# Patient Record
Sex: FEMALE | Race: WHITE | NOT HISPANIC OR LATINO | URBAN - METROPOLITAN AREA
[De-identification: names, ages, dates, MRNs, and addresses within clinical notes are randomized per-mention and may not be internally consistent; named-entity substitution may affect disease eponyms.]

---

## 2017-02-18 ENCOUNTER — EMERGENCY (EMERGENCY)
Facility: HOSPITAL | Age: 57
LOS: 1 days | Discharge: HOME | End: 2017-02-18
Attending: EMERGENCY MEDICINE
Payer: MEDICARE

## 2017-02-18 VITALS — WEIGHT: 111.99 LBS | HEIGHT: 63 IN

## 2017-02-18 DIAGNOSIS — Q87.1 CONGENITAL MALFORMATION SYNDROMES PREDOMINANTLY ASSOCIATED WITH SHORT STATURE: ICD-10-CM

## 2017-02-18 DIAGNOSIS — Z98.89 OTHER SPECIFIED POSTPROCEDURAL STATES: Chronic | ICD-10-CM

## 2017-02-18 DIAGNOSIS — F03.90 UNSPECIFIED DEMENTIA, UNSPECIFIED SEVERITY, WITHOUT BEHAVIORAL DISTURBANCE, PSYCHOTIC DISTURBANCE, MOOD DISTURBANCE, AND ANXIETY: ICD-10-CM

## 2017-02-18 DIAGNOSIS — E27.9 DISORDER OF ADRENAL GLAND, UNSPECIFIED: Chronic | ICD-10-CM

## 2017-02-18 DIAGNOSIS — G62.9 POLYNEUROPATHY, UNSPECIFIED: ICD-10-CM

## 2017-02-18 DIAGNOSIS — E03.9 HYPOTHYROIDISM, UNSPECIFIED: ICD-10-CM

## 2017-02-18 DIAGNOSIS — Z79.899 OTHER LONG TERM (CURRENT) DRUG THERAPY: ICD-10-CM

## 2017-02-18 DIAGNOSIS — Z91.041 RADIOGRAPHIC DYE ALLERGY STATUS: ICD-10-CM

## 2017-02-18 DIAGNOSIS — R10.84 GENERALIZED ABDOMINAL PAIN: ICD-10-CM

## 2017-02-18 DIAGNOSIS — R53.1 WEAKNESS: ICD-10-CM

## 2017-02-18 DIAGNOSIS — G90.9 DISORDER OF THE AUTONOMIC NERVOUS SYSTEM, UNSPECIFIED: ICD-10-CM

## 2017-02-18 DIAGNOSIS — R41.82 ALTERED MENTAL STATUS, UNSPECIFIED: ICD-10-CM

## 2017-02-18 DIAGNOSIS — R11.0 NAUSEA: ICD-10-CM

## 2017-02-18 DIAGNOSIS — G93.40 ENCEPHALOPATHY, UNSPECIFIED: ICD-10-CM

## 2017-02-18 DIAGNOSIS — R00.0 TACHYCARDIA, UNSPECIFIED: ICD-10-CM

## 2017-02-18 DIAGNOSIS — Z98.51 TUBAL LIGATION STATUS: Chronic | ICD-10-CM

## 2017-02-18 DIAGNOSIS — Z90.49 ACQUIRED ABSENCE OF OTHER SPECIFIED PARTS OF DIGESTIVE TRACT: Chronic | ICD-10-CM

## 2017-02-18 DIAGNOSIS — Z90.710 ACQUIRED ABSENCE OF BOTH CERVIX AND UTERUS: Chronic | ICD-10-CM

## 2017-02-18 DIAGNOSIS — F17.200 NICOTINE DEPENDENCE, UNSPECIFIED, UNCOMPLICATED: ICD-10-CM

## 2017-02-18 LAB
ALBUMIN SERPL ELPH-MCNC: 3.3 G/DL — SIGNIFICANT CHANGE UP (ref 3.3–5.2)
ALP SERPL-CCNC: 103 U/L — SIGNIFICANT CHANGE UP (ref 40–120)
ALT FLD-CCNC: 7 U/L — SIGNIFICANT CHANGE UP
AMMONIA BLD-MCNC: 39 UMOL/L — SIGNIFICANT CHANGE UP (ref 11–55)
ANION GAP SERPL CALC-SCNC: 16 MMOL/L — SIGNIFICANT CHANGE UP (ref 5–17)
APTT BLD: 28.5 SEC — SIGNIFICANT CHANGE UP (ref 27.5–37.4)
AST SERPL-CCNC: 17 U/L — SIGNIFICANT CHANGE UP
BASOPHILS # BLD AUTO: 0 K/UL — SIGNIFICANT CHANGE UP (ref 0–0.2)
BASOPHILS NFR BLD AUTO: 0.9 % — SIGNIFICANT CHANGE UP (ref 0–2)
BILIRUB SERPL-MCNC: 0.2 MG/DL — LOW (ref 0.4–2)
BUN SERPL-MCNC: 14 MG/DL — SIGNIFICANT CHANGE UP (ref 8–20)
CALCIUM SERPL-MCNC: 8.5 MG/DL — LOW (ref 8.6–10.2)
CHLORIDE SERPL-SCNC: 102 MMOL/L — SIGNIFICANT CHANGE UP (ref 98–107)
CO2 SERPL-SCNC: 20 MMOL/L — LOW (ref 22–29)
CREAT SERPL-MCNC: 0.78 MG/DL — SIGNIFICANT CHANGE UP (ref 0.5–1.3)
EOSINOPHIL # BLD AUTO: 0 K/UL — SIGNIFICANT CHANGE UP (ref 0–0.5)
EOSINOPHIL NFR BLD AUTO: 0.2 % — SIGNIFICANT CHANGE UP (ref 0–6)
GLUCOSE SERPL-MCNC: 90 MG/DL — SIGNIFICANT CHANGE UP (ref 70–115)
HCT VFR BLD CALC: 33.1 % — LOW (ref 37–47)
HGB BLD-MCNC: 11.2 G/DL — LOW (ref 12–16)
INR BLD: 1.24 RATIO — HIGH (ref 0.88–1.16)
LIDOCAIN IGE QN: 14 U/L — LOW (ref 22–51)
LYMPHOCYTES # BLD AUTO: 1.6 K/UL — SIGNIFICANT CHANGE UP (ref 1–4.8)
LYMPHOCYTES # BLD AUTO: 26.9 % — SIGNIFICANT CHANGE UP (ref 20–55)
MCHC RBC-ENTMCNC: 31.5 PG — HIGH (ref 27–31)
MCHC RBC-ENTMCNC: 33.8 G/DL — SIGNIFICANT CHANGE UP (ref 32–36)
MCV RBC AUTO: 93.2 FL — SIGNIFICANT CHANGE UP (ref 81–99)
MONOCYTES # BLD AUTO: 0.7 K/UL — SIGNIFICANT CHANGE UP (ref 0–0.8)
MONOCYTES NFR BLD AUTO: 12.8 % — HIGH (ref 3–10)
NEUTROPHILS # BLD AUTO: 3.4 K/UL — SIGNIFICANT CHANGE UP (ref 1.8–8)
NEUTROPHILS NFR BLD AUTO: 58.9 % — SIGNIFICANT CHANGE UP (ref 37–73)
PLATELET # BLD AUTO: 251 K/UL — SIGNIFICANT CHANGE UP (ref 150–400)
POTASSIUM SERPL-MCNC: 3 MMOL/L — LOW (ref 3.5–5.3)
POTASSIUM SERPL-SCNC: 3 MMOL/L — LOW (ref 3.5–5.3)
PROT SERPL-MCNC: 8 G/DL — SIGNIFICANT CHANGE UP (ref 6.6–8.7)
PROTHROM AB SERPL-ACNC: 13.7 SEC — HIGH (ref 10–13.1)
RBC # BLD: 3.55 M/UL — LOW (ref 4.4–5.2)
RBC # FLD: 16.9 % — HIGH (ref 11–15.6)
SODIUM SERPL-SCNC: 138 MMOL/L — SIGNIFICANT CHANGE UP (ref 135–145)
TROPONIN T SERPL-MCNC: <0.01 NG/ML — SIGNIFICANT CHANGE UP (ref 0–0.06)
WBC # BLD: 5.8 K/UL — SIGNIFICANT CHANGE UP (ref 4.8–10.8)
WBC # FLD AUTO: 5.8 K/UL — SIGNIFICANT CHANGE UP (ref 4.8–10.8)

## 2017-02-18 PROCEDURE — 71020: CPT | Mod: 26

## 2017-02-18 PROCEDURE — 99285 EMERGENCY DEPT VISIT HI MDM: CPT

## 2017-02-18 PROCEDURE — 70450 CT HEAD/BRAIN W/O DYE: CPT | Mod: 26

## 2017-02-18 PROCEDURE — 74176 CT ABD & PELVIS W/O CONTRAST: CPT | Mod: 26

## 2017-02-18 PROCEDURE — 93010 ELECTROCARDIOGRAM REPORT: CPT

## 2017-02-18 RX ORDER — SODIUM CHLORIDE 9 MG/ML
3 INJECTION INTRAMUSCULAR; INTRAVENOUS; SUBCUTANEOUS ONCE
Qty: 0 | Refills: 0 | Status: COMPLETED | OUTPATIENT
Start: 2017-02-18 | End: 2017-02-18

## 2017-02-18 RX ORDER — SODIUM CHLORIDE 9 MG/ML
1000 INJECTION INTRAMUSCULAR; INTRAVENOUS; SUBCUTANEOUS ONCE
Qty: 0 | Refills: 0 | Status: COMPLETED | OUTPATIENT
Start: 2017-02-18 | End: 2017-02-18

## 2017-02-18 RX ORDER — NALOXONE HYDROCHLORIDE 4 MG/.1ML
0.4 SPRAY NASAL ONCE
Qty: 0 | Refills: 0 | Status: COMPLETED | OUTPATIENT
Start: 2017-02-18 | End: 2017-02-18

## 2017-02-18 RX ORDER — POTASSIUM CHLORIDE 20 MEQ
40 PACKET (EA) ORAL ONCE
Qty: 0 | Refills: 0 | Status: COMPLETED | OUTPATIENT
Start: 2017-02-18 | End: 2017-02-18

## 2017-02-18 RX ADMIN — SODIUM CHLORIDE 1000 MILLILITER(S): 9 INJECTION INTRAMUSCULAR; INTRAVENOUS; SUBCUTANEOUS at 15:00

## 2017-02-18 RX ADMIN — NALOXONE HYDROCHLORIDE 0.4 MILLIGRAM(S): 4 SPRAY NASAL at 15:00

## 2017-02-18 RX ADMIN — Medication 40 MILLIEQUIVALENT(S): at 22:51

## 2017-02-18 RX ADMIN — SODIUM CHLORIDE 3 MILLILITER(S): 9 INJECTION INTRAMUSCULAR; INTRAVENOUS; SUBCUTANEOUS at 15:00

## 2017-02-18 NOTE — ED PROVIDER NOTE - PMH
Cushings syndrome    Hypothyroidism    Lupus    POTS (postural orthostatic tachycardia syndrome)    PVD (peripheral vascular disease)    Sjogren-Addy syndrome  herniated disc, thorasic

## 2017-02-18 NOTE — CONSULT NOTE ADULT - ASSESSMENT
Encephalopathy - possibly from medication (narcotic or benzodiazepines).  Doubt meningitis or stroke. autonomic dysfunction might account for nausea/vomiting.  would be concerned about infection.   Sjogrens - polyneuropathy, autonomic dysfunction.

## 2017-02-18 NOTE — CONSULT NOTE ADULT - SUBJECTIVE AND OBJECTIVE BOX
HPI:  56 year old female with sjogrens syndrome and dysautonomia who has had multiple admissions for unresponsive and syncope. she is being treated with ivig and rituxan, and multiple medications. she has done well but has episodes of lethargy and unresponsive that has been related to use of narcotic or benzodiazepines self-medication at home. She has been lethargic at home and has not eaten nor taken medications ? and now is brought in with lethargy and poor responsiveness.      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 No nausea, vomiting, or hematemesis;  No melena or hematochezia.  GENITOURINARY: No  hematuria, or incontinence  NEUROLOGICAL: No headaches, memory loss,   SKIN: No itching, rashes, or lesions   LYMPH NODES: No enlarged glands  ENDOCRINE: No heat or cold intolerance;   MUSCULOSKELETAL: No joint swelling; No extremity pain  PSYCHIATRIC: No depression, anxiety, mood swings, or difficulty sleeping  HEME/LYMPH: No easy bruising, or bleeding gums    MEDICATIONS  (STANDING):  sodium chloride 0.9% lock flush 3milliLiter(s) IV Push once  naloxone Injectable 0.4milliGRAM(s) IV Push Once    MEDICATIONS  (PRN):      Allergies    contrast media (iodine-based) (Rash)  ertapenem (Other)    Intolerances        SOCIAL HISTORY:    FAMILY HISTORY:  No pertinent family history in first degree relatives      PHYSICAL EXAM:  Vital Signs Last 24 Hrs  T(F): 98  HR: 84  BP: 128/74  RR: 16  Wt(kg): --    GENERAL: NAD, well-groomed, well-developed  HEAD:  Atraumatic, Normocephalic  EYES: EOMI, PERRLA, conjunctiva and sclera clear  NECK: Supple, No JVD, thyroid non-palpable  Chest: Clear  HEART: Regular rate and rhythm; No murmurs audible  Vascular - no carotid bruit  Peripheral - no edema in the legs.  Skin - no rashes  NERVOUS SYSTEM:  lethargic, easily arouses, speech dysarthric, follows some simple commands. pupils equal and reactive. EOMI. Visual fields full to threat. Facial strength normal. variable movement all 4 limbs spontaneously but withdraws symmetrically to pain. motor tone normal, DTR reduced. plantars flexor.   Psych: mood flattened.           LABS: pending      RADIOLOGY & ADDITIONAL STUDIES:  CT brain - pending

## 2017-02-18 NOTE — ED ADULT TRIAGE NOTE - CHIEF COMPLAINT QUOTE
BIBA from home with AMS x several days as per EMS. EMS states patient's family reported pt with "many neurological diseases."  FS 91. C/O weakness and right sided abdominal pain. Speech slow but logical. Unaware of patient's baseline. Awaiting family arrival. 20g L AC. Received  cc bolus.

## 2017-02-18 NOTE — CONSULT NOTE ADULT - PROBLEM SELECTOR RECOMMENDATION 5
review electrolytes and CT brain.  CMP-CBC, ESR, amylase, calcium-phosphate; immunofixation, quantitative immunoglobulin, C-reactive protein, CPK, aldolase  review medications being prescribed as outpatient re florinef/midodrine and other medicaitons.  urine tox-alcohol levels  IV fluids for hydration, nutritional support, telemetry, social service.

## 2017-02-18 NOTE — ED PROVIDER NOTE - OBJECTIVE STATEMENT
55 yo F with pmh of cushings, sjorgren-beebe syndrom, hypothyroidism, lupus, postural orthostatic hypotension syndrome presents to the ED for AMS. Pt states she has some abdominal pain x 2 days. As well as vomiting.

## 2017-02-18 NOTE — ED ADULT NURSE NOTE - OBJECTIVE STATEMENT
Pt biba from home with days of AMS, pt is easily aroused but very sleepy.  Pt given narcan 0.4 ivp and became more alert. She takes multiple medications for her med hx.  Hr is regular, lung sounds clear b/l abd soft with positve bowel sounds in all four quadrants will cont to monitor.

## 2017-02-19 VITALS
HEART RATE: 74 BPM | RESPIRATION RATE: 16 BRPM | SYSTOLIC BLOOD PRESSURE: 118 MMHG | OXYGEN SATURATION: 96 % | TEMPERATURE: 98 F | DIASTOLIC BLOOD PRESSURE: 76 MMHG

## 2017-02-19 LAB
CK SERPL-CCNC: 41 U/L — SIGNIFICANT CHANGE UP (ref 25–170)
CRP SERPL-MCNC: 0.3 MG/DL — SIGNIFICANT CHANGE UP (ref 0–0.4)
IGE SERPL-ACNC: 25 IU/ML — SIGNIFICANT CHANGE UP (ref 0–100)
PHOSPHATE SERPL-MCNC: 3.6 MG/DL — SIGNIFICANT CHANGE UP (ref 2.4–4.7)
T3 SERPL-MCNC: 62 NG/DL — LOW (ref 80–200)

## 2017-02-19 PROCEDURE — 85652 RBC SED RATE AUTOMATED: CPT

## 2017-02-19 PROCEDURE — 93005 ELECTROCARDIOGRAM TRACING: CPT

## 2017-02-19 PROCEDURE — 84436 ASSAY OF TOTAL THYROXINE: CPT

## 2017-02-19 PROCEDURE — 85610 PROTHROMBIN TIME: CPT

## 2017-02-19 PROCEDURE — 85730 THROMBOPLASTIN TIME PARTIAL: CPT

## 2017-02-19 PROCEDURE — 86140 C-REACTIVE PROTEIN: CPT

## 2017-02-19 PROCEDURE — 83690 ASSAY OF LIPASE: CPT

## 2017-02-19 PROCEDURE — 82962 GLUCOSE BLOOD TEST: CPT

## 2017-02-19 PROCEDURE — 70450 CT HEAD/BRAIN W/O DYE: CPT

## 2017-02-19 PROCEDURE — 84100 ASSAY OF PHOSPHORUS: CPT

## 2017-02-19 PROCEDURE — 80053 COMPREHEN METABOLIC PANEL: CPT

## 2017-02-19 PROCEDURE — 71046 X-RAY EXAM CHEST 2 VIEWS: CPT

## 2017-02-19 PROCEDURE — 82085 ASSAY OF ALDOLASE: CPT

## 2017-02-19 PROCEDURE — 84443 ASSAY THYROID STIM HORMONE: CPT

## 2017-02-19 PROCEDURE — 82140 ASSAY OF AMMONIA: CPT

## 2017-02-19 PROCEDURE — 96374 THER/PROPH/DIAG INJ IV PUSH: CPT

## 2017-02-19 PROCEDURE — 84480 ASSAY TRIIODOTHYRONINE (T3): CPT

## 2017-02-19 PROCEDURE — 85027 COMPLETE CBC AUTOMATED: CPT

## 2017-02-19 PROCEDURE — 99285 EMERGENCY DEPT VISIT HI MDM: CPT | Mod: 25

## 2017-02-19 PROCEDURE — 84484 ASSAY OF TROPONIN QUANT: CPT

## 2017-02-19 PROCEDURE — 82550 ASSAY OF CK (CPK): CPT

## 2017-02-19 PROCEDURE — 86334 IMMUNOFIX E-PHORESIS SERUM: CPT

## 2017-02-19 PROCEDURE — 74176 CT ABD & PELVIS W/O CONTRAST: CPT

## 2017-02-19 PROCEDURE — 82785 ASSAY OF IGE: CPT

## 2017-02-19 NOTE — PROGRESS NOTE ADULT - SUBJECTIVE AND OBJECTIVE BOX
CHIEF COMPLAINT/INTERVAL HISTORY:  patient was evaluated in the Er because of AMS. patient was seen by Dr. Brewer, Neurologist who recommend Blood tests. patient feels better and wants to go home. labs and CT scan results were reviewed with the patient. patient defers admission and prefers to f/u with the neurologist as outpatient. Dr. Brewer was called and informed about the patient's desire to go home and f/u as outpatient. Dr Brewer advised if the patient feels fine she may go home and f/u as outpatient as well as f/u labs results.    REVIEW OF SYSTEMS:    Vital Signs Last 24 Hrs  T(C): 36.7, Max: 36.7 (02-18 @ 13:49)  T(F): 98, Max: 98 (02-18 @ 13:49)  HR: 86 (84 - 86)  BP: 130/74 (128/74 - 130/74)  BP(mean): --  RR: 18 (16 - 18)  SpO2: 99% (99% - 99%)    PHYSICAL EXAM:  GENERAL: NAD  HEENT: EOMI, hearing normal, conjunctiva and sclera clear  Chest: CTA bilaterally  CV: S1S2, RRR, no edema  GI: soft, +BS, NT/ND  Musculoskeletal: FROM, no deformity  Psychiatric: affect nL, mood nL  Skin: warm and dry    LABS:                        11.2   5.8   )-----------( 251      ( 18 Feb 2017 16:00 )             33.1     18 Feb 2017 16:00    138    |  102    |  14.0   ----------------------------<  90     3.0     |  20.0   |  0.78     Ca    8.5        18 Feb 2017 16:00    TPro  8.0    /  Alb  3.3    /  TBili  0.2    /  DBili  x      /  AST  17     /  ALT  7      /  AlkPhos  103    18 Feb 2017 16:00    PT/INR - ( 18 Feb 2017 16:00 )   PT: 13.7 sec;   INR: 1.24 ratio         PTT - ( 18 Feb 2017 16:00 )  PTT:28.5 sec      Assessment and Plan:

## 2017-02-19 NOTE — ED ADULT NURSE REASSESSMENT NOTE - NS ED NURSE REASSESS COMMENT FT1
pt stated she feels dizzy.  unsteady gait to bathroom.  pt assisted to the bathroom and back to bed. pt encouraged to reconsider refusing admission and agree to overnight observation.  pt refused.  states she does not live alone and has someone at home that can watch her.  dr ivory called to bedside to reassess pt.  pt stated to dr ivory that she always has an unsteady gait due to orthostatic tachycardia.  also stated she does not want to stay and will call friend to pick her up.

## 2017-02-20 LAB — INTERPRETATION SERPL IFE-IMP: SIGNIFICANT CHANGE UP

## 2017-02-26 NOTE — ED ADULT NURSE NOTE - IN THE PAST 12 MONTHS HAVE YOU USED DRUGS OTHER THAN THOSE REQUIRED FOR MEDICAL REASON?
Back Pain: Care Instructions  Your Care Instructions    Back pain has many possible causes. It is often related to problems with muscles and ligaments of the back. It may also be related to problems with the nerves, discs, or bones of the back. Moving, lifting, standing, sitting, or sleeping in an awkward way can strain the back. Sometimes you don't notice the injury until later. Arthritis is another common cause of back pain. Although it may hurt a lot, back pain usually improves on its own within several weeks. Most people recover in 12 weeks or less. Using good home treatment and being careful not to stress your back can help you feel better sooner. Follow-up care is a key part of your treatment and safety. Be sure to make and go to all appointments, and call your doctor if you are having problems. Its also a good idea to know your test results and keep a list of the medicines you take. How can you care for yourself at home? · Sit or lie in positions that are most comfortable and reduce your pain. Try one of these positions when you lie down:  ¨ Lie on your back with your knees bent and supported by large pillows. ¨ Lie on the floor with your legs on the seat of a sofa or chair. Mary David on your side with your knees and hips bent and a pillow between your legs. ¨ Lie on your stomach if it does not make pain worse. · Do not sit up in bed, and avoid soft couches and twisted positions. Bed rest can help relieve pain at first, but it delays healing. Avoid bed rest after the first day of back pain. · Change positions every 30 minutes. If you must sit for long periods of time, take breaks from sitting. Get up and walk around, or lie in a comfortable position. · Try using a heating pad on a low or medium setting for 15 to 20 minutes every 2 or 3 hours. Try a warm shower in place of one session with the heating pad. · You can also try an ice pack for 10 to 15 minutes every 2 to 3 hours.  Put a thin cloth between the ice pack and your skin. · Take pain medicines exactly as directed. ¨ If the doctor gave you a prescription medicine for pain, take it as prescribed. ¨ If you are not taking a prescription pain medicine, ask your doctor if you can take an over-the-counter medicine. · Take short walks several times a day. You can start with 5 to 10 minutes, 3 or 4 times a day, and work up to longer walks. Walk on level surfaces and avoid hills and stairs until your back is better. · Return to work and other activities as soon as you can. Continued rest without activity is usually not good for your back. · To prevent future back pain, do exercises to stretch and strengthen your back and stomach. Learn how to use good posture, safe lifting techniques, and proper body mechanics. When should you call for help? Call your doctor now or seek immediate medical care if:  · You have new or worsening numbness in your legs. · You have new or worsening weakness in your legs. (This could make it hard to stand up.)  · You lose control of your bladder or bowels. Watch closely for changes in your health, and be sure to contact your doctor if:  · Your pain gets worse. · You are not getting better after 2 weeks. Where can you learn more? Go to http://suma-huong.info/. Enter Z629 in the search box to learn more about \"Back Pain: Care Instructions. \"  Current as of: May 23, 2016  Content Version: 11.1  © 1686-1483 [employer] video. Care instructions adapted under license by [employer] (which disclaims liability or warranty for this information). If you have questions about a medical condition or this instruction, always ask your healthcare professional. [address] any warranty or liability for your use of this information. We hope that we have addressed all of your medical concerns.  The examination and treatment you received in the Emergency Department were for an emergent problem and were not intended as complete care. It is important that you follow up with your healthcare provider(s) for ongoing care. If your symptoms worsen or do not improve as expected, and you are unable to reach your usual health care provider(s), you should return to the Emergency Department. Today's healthcare is undergoing tremendous change, and patient satisfaction surveys are one of the many tools to assess the quality of medical care. You may receive a survey from the [employer] regarding your experience in the Emergency Department. I hope that your experience has been completely positive, particularly the medical care that I provided. As such, please participate in the survey; anything less than excellent does not meet my expectations or intentions. [facility] and [facility] participate in nationally recognized quality of care measures. If your blood pressure is greater than 120/80, as reported below, we urge that you seek medical care to address the potential of high blood pressure, commonly known as hypertension. Hypertension can be hereditary or can be caused by certain medical conditions, pain, stress, or \"white coat syndrome. \"       Please make an appointment with your health care provider(s) for follow up of your Emergency Department visit. VITALS:   Patient Vitals for the past 8 hrs:   Temp Pulse Resp BP SpO2   02/26/17 1226 98.4 °F (36.9 °C) 65 16 (!) 149/99 99 %          Thank you for allowing us to provide you with medical care today. We realize that you have many choices for your emergency care needs. Please choose us in the future for any continued health care needs. Jh Ovalle, [address] [place] [address] 20.   Office: [phone]            Recent Results (from the past 24 hour(s))   URINALYSIS W/MICROSCOPIC    Collection Time: 02/26/17  2:16 PM   Result Value Ref Range    Color YELLOW/STRAW      Appearance CLOUDY (A) CLEAR      Specific gravity 1.020 1.003 - 1.030      pH (UA) 7.0 5.0 - 8.0      Protein NEGATIVE  NEG mg/dL    Glucose NEGATIVE  NEG mg/dL    Ketone NEGATIVE  NEG mg/dL    Bilirubin NEGATIVE  NEG      Blood NEGATIVE  NEG      Urobilinogen 0.2 0.2 - 1.0 EU/dL    Nitrites NEGATIVE  NEG      Leukocyte Esterase NEGATIVE  NEG      WBC 0-4 0 - 4 /hpf    RBC 0-5 0 - 5 /hpf    Epithelial cells FEW FEW /lpf    Bacteria NEGATIVE  NEG /hpf    Hyaline cast 0-2 0 - 5 /lpf       Xr Spine Lumb 2 Or 3 V    Result Date: 2/26/2017  EXAM:  XR SPINE LUMB 2 OR 3 V INDICATION:   Low back pain COMPARISON: None. FINDINGS: AP, lateral and spot lateral views of the lumbar spine demonstrate normal alignment. The vertebral body heights and disc spaces are well-preserved. There is no fracture, subluxation or other abnormality. IMPRESSION:  Unremarkable lumbar spine. No

## 2017-03-28 ENCOUNTER — INPATIENT (INPATIENT)
Facility: HOSPITAL | Age: 57
LOS: 7 days | Discharge: HOME | DRG: 312 | End: 2017-04-05
Attending: HOSPITALIST | Admitting: INTERNAL MEDICINE
Payer: MEDICARE

## 2017-03-28 VITALS
OXYGEN SATURATION: 98 % | HEIGHT: 63 IN | SYSTOLIC BLOOD PRESSURE: 117 MMHG | RESPIRATION RATE: 20 BRPM | DIASTOLIC BLOOD PRESSURE: 68 MMHG | HEART RATE: 122 BPM | TEMPERATURE: 98 F | WEIGHT: 134.92 LBS

## 2017-03-28 DIAGNOSIS — Z98.89 OTHER SPECIFIED POSTPROCEDURAL STATES: Chronic | ICD-10-CM

## 2017-03-28 DIAGNOSIS — R56.9 UNSPECIFIED CONVULSIONS: ICD-10-CM

## 2017-03-28 DIAGNOSIS — R94.31 ABNORMAL ELECTROCARDIOGRAM [ECG] [EKG]: ICD-10-CM

## 2017-03-28 DIAGNOSIS — R55 SYNCOPE AND COLLAPSE: ICD-10-CM

## 2017-03-28 DIAGNOSIS — E27.9 DISORDER OF ADRENAL GLAND, UNSPECIFIED: Chronic | ICD-10-CM

## 2017-03-28 DIAGNOSIS — Z29.9 ENCOUNTER FOR PROPHYLACTIC MEASURES, UNSPECIFIED: ICD-10-CM

## 2017-03-28 DIAGNOSIS — E03.9 HYPOTHYROIDISM, UNSPECIFIED: ICD-10-CM

## 2017-03-28 DIAGNOSIS — I95.1 ORTHOSTATIC HYPOTENSION: ICD-10-CM

## 2017-03-28 DIAGNOSIS — Z90.710 ACQUIRED ABSENCE OF BOTH CERVIX AND UTERUS: Chronic | ICD-10-CM

## 2017-03-28 DIAGNOSIS — Z98.51 TUBAL LIGATION STATUS: Chronic | ICD-10-CM

## 2017-03-28 DIAGNOSIS — Z90.49 ACQUIRED ABSENCE OF OTHER SPECIFIED PARTS OF DIGESTIVE TRACT: Chronic | ICD-10-CM

## 2017-03-28 LAB
ALBUMIN SERPL ELPH-MCNC: 3.4 G/DL — SIGNIFICANT CHANGE UP (ref 3.3–5.2)
ALP SERPL-CCNC: 85 U/L — SIGNIFICANT CHANGE UP (ref 40–120)
ALT FLD-CCNC: 10 U/L — SIGNIFICANT CHANGE UP
AMPHET UR-MCNC: NEGATIVE — SIGNIFICANT CHANGE UP
ANION GAP SERPL CALC-SCNC: 13 MMOL/L — SIGNIFICANT CHANGE UP (ref 5–17)
APAP SERPL-MCNC: 12.8 UG/ML — SIGNIFICANT CHANGE UP (ref 10–26)
APPEARANCE UR: CLEAR — SIGNIFICANT CHANGE UP
AST SERPL-CCNC: 18 U/L — SIGNIFICANT CHANGE UP
BARBITURATES UR SCN-MCNC: NEGATIVE — SIGNIFICANT CHANGE UP
BENZODIAZ UR-MCNC: NEGATIVE — SIGNIFICANT CHANGE UP
BILIRUB SERPL-MCNC: 0.2 MG/DL — LOW (ref 0.4–2)
BILIRUB UR-MCNC: NEGATIVE — SIGNIFICANT CHANGE UP
BUN SERPL-MCNC: 13 MG/DL — SIGNIFICANT CHANGE UP (ref 8–20)
CALCIUM SERPL-MCNC: 8.7 MG/DL — SIGNIFICANT CHANGE UP (ref 8.6–10.2)
CHLORIDE SERPL-SCNC: 102 MMOL/L — SIGNIFICANT CHANGE UP (ref 98–107)
CO2 SERPL-SCNC: 21 MMOL/L — LOW (ref 22–29)
COCAINE METAB.OTHER UR-MCNC: NEGATIVE — SIGNIFICANT CHANGE UP
COLOR SPEC: YELLOW — SIGNIFICANT CHANGE UP
CREAT SERPL-MCNC: 0.58 MG/DL — SIGNIFICANT CHANGE UP (ref 0.5–1.3)
DIFF PNL FLD: NEGATIVE — SIGNIFICANT CHANGE UP
ETHANOL SERPL-MCNC: <10 MG/DL — SIGNIFICANT CHANGE UP
GLUCOSE SERPL-MCNC: 94 MG/DL — SIGNIFICANT CHANGE UP (ref 70–115)
GLUCOSE UR QL: NEGATIVE MG/DL — SIGNIFICANT CHANGE UP
HCT VFR BLD CALC: 32.6 % — LOW (ref 37–47)
HGB BLD-MCNC: 10.9 G/DL — LOW (ref 12–16)
KETONES UR-MCNC: NEGATIVE — SIGNIFICANT CHANGE UP
LEUKOCYTE ESTERASE UR-ACNC: NEGATIVE — SIGNIFICANT CHANGE UP
LIDOCAIN IGE QN: 11 U/L — LOW (ref 22–51)
MAGNESIUM SERPL-MCNC: 2 MG/DL — SIGNIFICANT CHANGE UP (ref 1.8–2.5)
MCHC RBC-ENTMCNC: 32.2 PG — HIGH (ref 27–31)
MCHC RBC-ENTMCNC: 33.4 G/DL — SIGNIFICANT CHANGE UP (ref 32–36)
MCV RBC AUTO: 96.4 FL — SIGNIFICANT CHANGE UP (ref 81–99)
METHADONE UR-MCNC: POSITIVE
NITRITE UR-MCNC: NEGATIVE — SIGNIFICANT CHANGE UP
OPIATES UR-MCNC: NEGATIVE — SIGNIFICANT CHANGE UP
PCP SPEC-MCNC: SIGNIFICANT CHANGE UP
PCP UR-MCNC: NEGATIVE — SIGNIFICANT CHANGE UP
PH UR: 6 — SIGNIFICANT CHANGE UP (ref 4.8–8)
PLATELET # BLD AUTO: 299 K/UL — SIGNIFICANT CHANGE UP (ref 150–400)
POTASSIUM SERPL-MCNC: 4 MMOL/L — SIGNIFICANT CHANGE UP (ref 3.5–5.3)
POTASSIUM SERPL-SCNC: 4 MMOL/L — SIGNIFICANT CHANGE UP (ref 3.5–5.3)
PROT SERPL-MCNC: 8.8 G/DL — HIGH (ref 6.6–8.7)
PROT UR-MCNC: NEGATIVE MG/DL — SIGNIFICANT CHANGE UP
RBC # BLD: 3.38 M/UL — LOW (ref 4.4–5.2)
RBC # FLD: 18 % — HIGH (ref 11–15.6)
SALICYLATES SERPL-MCNC: <2 MG/DL — LOW (ref 10–20)
SODIUM SERPL-SCNC: 136 MMOL/L — SIGNIFICANT CHANGE UP (ref 135–145)
SP GR SPEC: 1.01 — SIGNIFICANT CHANGE UP (ref 1.01–1.02)
THC UR QL: NEGATIVE — SIGNIFICANT CHANGE UP
TROPONIN T SERPL-MCNC: <0.01 NG/ML — SIGNIFICANT CHANGE UP (ref 0–0.06)
TSH SERPL-MCNC: 5.8 UIU/ML — HIGH (ref 0.27–4.2)
UROBILINOGEN FLD QL: NEGATIVE MG/DL — SIGNIFICANT CHANGE UP
WBC # BLD: 5.3 K/UL — SIGNIFICANT CHANGE UP (ref 4.8–10.8)
WBC # FLD AUTO: 5.3 K/UL — SIGNIFICANT CHANGE UP (ref 4.8–10.8)

## 2017-03-28 PROCEDURE — 93306 TTE W/DOPPLER COMPLETE: CPT | Mod: 26

## 2017-03-28 PROCEDURE — 99285 EMERGENCY DEPT VISIT HI MDM: CPT

## 2017-03-28 PROCEDURE — 70450 CT HEAD/BRAIN W/O DYE: CPT | Mod: 26

## 2017-03-28 PROCEDURE — 99233 SBSQ HOSP IP/OBS HIGH 50: CPT

## 2017-03-28 PROCEDURE — 93880 EXTRACRANIAL BILAT STUDY: CPT | Mod: 26

## 2017-03-28 RX ORDER — MAGNESIUM SULFATE 500 MG/ML
2 VIAL (ML) INJECTION ONCE
Qty: 0 | Refills: 0 | Status: COMPLETED | OUTPATIENT
Start: 2017-03-28 | End: 2017-03-28

## 2017-03-28 RX ORDER — FLUDROCORTISONE ACETATE 0.1 MG/1
0.1 TABLET ORAL DAILY
Qty: 0 | Refills: 0 | Status: DISCONTINUED | OUTPATIENT
Start: 2017-03-29 | End: 2017-04-05

## 2017-03-28 RX ORDER — SODIUM CHLORIDE 9 MG/ML
1000 INJECTION INTRAMUSCULAR; INTRAVENOUS; SUBCUTANEOUS
Qty: 0 | Refills: 0 | Status: DISCONTINUED | OUTPATIENT
Start: 2017-03-28 | End: 2017-03-28

## 2017-03-28 RX ORDER — MIDODRINE HYDROCHLORIDE 2.5 MG/1
5 TABLET ORAL DAILY
Qty: 0 | Refills: 0 | Status: DISCONTINUED | OUTPATIENT
Start: 2017-03-29 | End: 2017-04-05

## 2017-03-28 RX ORDER — LEVOTHYROXINE SODIUM 125 MCG
50 TABLET ORAL DAILY
Qty: 0 | Refills: 0 | Status: DISCONTINUED | OUTPATIENT
Start: 2017-03-29 | End: 2017-03-30

## 2017-03-28 RX ADMIN — Medication 50 GRAM(S): at 18:39

## 2017-03-28 NOTE — H&P ADULT - HISTORY OF PRESENT ILLNESS
56 F with PMH sjogrens syndrome with peripheral neuropathy, chronic inflammatory demyelinating polyneuritis, restless leg syndrome, dysautonomia, GI dysmotility, orthostatic hypotension (on midodrine), patient has prior multiple hospitalizations for unresponsiveness/ syncope as well as prior concern for self medication with narcotics/ benzos. Patient follows with neurologist Dr. Brewer and is on treatment with IVIG and rituxan. Patient was in the neurologist office today and had a spell of decreased responsiveness. Patient recalls receiving the IVIG and then waking up in the hospital. Denies urinary/bowel incontinence.     BP supine 118/81 .. Normal Brain MRI April 2016. No h/o seizures 56 F with PMH sjogrens syndrome with peripheral neuropathy, chronic inflammatory demyelinating polyneuritis, restless leg syndrome, dysautonomia, GI dysmotility, orthostatic hypotension (on midodrine), patient has prior multiple hospitalizations for unresponsiveness/ syncope as well as prior concern for self medication with narcotics/ benzos. Patient follows with neurologist Dr. Brewer and is on treatment with IVIG and rituxan. Patient was in the neurologist office today and had a spell of decreased responsiveness. Patient recalls receiving the IVIG and then waking up in the hospital. Denies urinary/bowel incontinence. As per neuro normal brain MRI April 2016. Patient denies chest pain, SOB, abd pain, N/V, fever, chills, dysuria or any other complaints. All remainder ROS negative. In ER found to have prolonged QTC:610 and was given magnesium 2grams. Patient remained asymptomatic.

## 2017-03-28 NOTE — H&P ADULT - PSH
Adrenal cortex disease  partial adrenal gland removed   H/O foot surgery  left ankle   S/P appendectomy    S/P  section    S/P cholecystectomy    S/P hysterectomy  2015  S/P tubal ligation  and ablation    infusive port 2015

## 2017-03-28 NOTE — ED ADULT TRIAGE NOTE - CHIEF COMPLAINT QUOTE
pt alert and awake, + AMS, BIBA, as per ems, was receiving immunoglobulin treatment, while on treatment, seized  in front of staff for approx 1 minute, pt has hx of cocaine and methadone abuse, no seizure hx, left chest port in place from home

## 2017-03-28 NOTE — H&P ADULT - RS GEN PE MLT RESP DETAILS PC
no chest wall tenderness/clear to auscultation bilaterally/good air movement/no rales/airway patent/breath sounds equal/no wheezes/no rhonchi

## 2017-03-28 NOTE — H&P ADULT - NSHPSOCIALHISTORY_GEN_ALL_CORE
Active smoker 4cigs daily x >30 yrs   Denies alcohol/drug use uses percoset for pain at times, no herbal supplement use

## 2017-03-28 NOTE — H&P ADULT - ASSESSMENT
56 F with PMH sjogrens syndrome with peripheral neuropathy, chronic inflammatory demyelinating polyneuritis, restless leg syndrome, dysautonomia, GI dysmotility, orthostatic hypotension (on midodrine), patient has prior multiple hospitalizations for unresponsiveness/ syncope as well as prior concern for self medication with narcotics/ benzos, currently presented s/p episode of unresponsiveness concern for syncope vs seizure, extensive hx of orthostatic hypotension which is likely the cause.

## 2017-03-28 NOTE — CONSULT NOTE ADULT - SUBJECTIVE AND OBJECTIVE BOX
CHIEF COMPLAINT:  syncope    HPI: 57yFemale with h/o neuroSjogren's. Patient of Dr. Brewer. in IVIG and Rituxan.  Symptoms include peripheral neuropathy and dysautonomia with orthostasis and GI dysmotility  Patient was in our office today and had a spell of decreased responsivness. She remembers event and denies full LOC. No tongu bite or incontinence. She feels well right now and hopwes to go home  BP supine 118/81 .. Normal Brain MRI 2016. 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   S/P cholecystectomy:   S/P appendectomy    MEDICATIONS  (STANDING):    MEDICATIONS  (PRN):    Allergies    contrast media (iodine-based) (Rash)  ertapenem (Other)    Intolerances        FAMILY HISTORY:  No pertinent family history in first degree relatives          SOCIAL HISTORY:    Tobacco:  no  Alcohol:  no  Drugs:  denies        REVIEW OF SYSTEMS:    Relevant systems are negative except as noted in the chart, HPI, and PMH      VITAL SIGNS:  Vital Signs Last 24 Hrs  T(C): 36.4, Max: 36.4 (- @ 11:42)  T(F): 97.6, Max: 97.6 (- @ 11:42)  HR: 122 (122 - 122)  BP: 117/68 (117/68 - 117/68)  BP(mean): --  RR: 20 (20 - 20)  SpO2: 98% (98% - 98%)    PHYSICAL EXAMINATION:    General: Well-developed, well nourished, in no acute distress.  Cardiac:  Regular rate and rhythm. No carotid bruits appreciated.  Eyes: Fundoscopic examination was deferred.  Neurologic:  - Mental Status:  Alert, awake, oriented to person, place, and time; Speech is fluent with intact naming, repetition, and comprehension  Cranial Nerves II-XII:    II:  Visual acuity is normal for age ; Visual fields are full to confrontation; Pupils are equal, round, and reactive to light.  III, IV, VI:  Extraocular movements are intact without nystagmus.  V:  Facial sensation is intact in the V1-V3 distribution bilaterally.  VII:  Face is symmetric with normal eye closure and smile  VIII:  Hearing is intact and symmetric for age  IX, X:  Uvula is midline and soft palate rises symmetrically  XI:  Head turning and shoulder shrug are intact.  XII:  Tongue protrudes in the midline.  - Motor:  Strength is 5/5 throughout.  There is no pronator drift.  Normal muscle bulk and tone throughout.  - Reflexes:  absent  Plantar responses flexor.  - Sensory:  diminished to light touch, and joint-position sense.  - Coordination:  Finger-nose-finger is normal. Rapid alternating hand movements are intact.       LABS:  pend    RADIOLOGY & ADDITIONAL STUDIES:      IMPRESSION:  57 year old with neurosjogrens - neuropathy and dysautonomia. she did not eat today and michelle had a syncopal episode with orthostasis  Await labs and full ED eval..     PLAN:  1. Check orthostatics, labs; BP manamgent  2. Consider cardiac eval, EKG, tele etc,   3. MRI/A, carotid duplex  4  EEG  Will follow if admitted

## 2017-03-28 NOTE — ED ADULT NURSE NOTE - OBJECTIVE STATEMENT
Pt received Alert but confused.  Pt is aware she is in the hospital but is having difficulty finding her words   BIBA, as per ems, was receiving immunoglobulin treatment and seized  in front of staff for approx 1 minute pt has no seizure hx, left chest port in place from home.  HR is Normal Sinus Rhythm on card monitor, lung sounds clear b/l abd soft with positive bowel sounds in all four quadrants will cont to monitor.

## 2017-03-28 NOTE — H&P ADULT - PROBLEM SELECTOR PLAN 1
patient has extensive history of dysautonomia and known orthostatic hypotension with multiple prior hospitalizations, unlikely seizure   -c/w telemetry to monitor for arrhythmias pt does have prolonged qtc on ekg holding all medications that may worsen QTC   -CT head negative for any findings  -Neurology f/u noted and appreciated  -Orthostatics to be completed   -will start IVF   -c/w midodrine 5mg po qd  -F/I MRI/MRA head   -f/u carotid duplex  -f/u EEG   -f/u ECHO  -Cardiology consulted patient has extensive history of dysautonomia and known orthostatic hypotension with multiple prior hospitalizations, unlikely seizure   -c/w telemetry to monitor for arrhythmias pt does have prolonged qtc on ekg holding all medications that may worsen QTC  s/p magnesium  -ekg daily   -CT head negative for any findings  -Neurology f/u noted and appreciated  -Orthostatics to be completed   -will start IVF   -c/w midodrine 5mg po qd  -F/I MRI/MRA head   -f/u carotid duplex  -f/u EEG   -f/u ECHO  -Cardiology consulted  -Pt has methadone positive in her urine denies any drug use but states she takes percoset for pain at home

## 2017-03-28 NOTE — CONSULT NOTE ADULT - SUBJECTIVE AND OBJECTIVE BOX
Formerly [facility], [facility]                                   [address] [place] [address]                                                      PHONE: [phone]                                                         FAX: [phone]  [email or web address]  ---------------------------------------------------------------------------------------------------------------------------------    Reason for Consult: Syncope  CVS: Lei/Evens  HPI:  CARLOS EDUARDO MIRELES is an 57y Female sjogrens syndrome with peripheral neuropathy, chronic inflammatory demyelinating polyneuritis, restless leg syndrome, dysautonomia, GI dysmotility, Cushings, Postural orthostatic hypotension (on midodrine), PVD, Normal Echo and PET as per chart in 2015 patient has prior multiple hospitalizations for unresponsiveness/ syncope. Pt was at Neurologist office today and noted to have multiple episodes of unresponsiveness. Patient is forgetful and not able to remember the events of today.    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    Vital Signs Last 24 Hrs  T(C): 36.4, Max: 36.4 ( @ 11:42)  T(F): 97.6, Max: 97.6 ( @ 11:42)  HR: 122 (122 - 122)  BP: 117/68 (117/68 - 117/68)  BP(mean): --  RR: 20 (20 - 20)  SpO2: 98% (98% - 98%)  ICU Vital Signs Last 24 Hrs  CARLOS EDUARDO MIRELES  I&O's Detail    I&O's Summary    Drug Dosing Weight  CARLOS EDUARDO ALINE      PHYSICAL EXAM:  General: Appears well developed, well nourished alert and cooperative.  HEENT: Head; normocephalic, atraumatic.  Eyes: Pupils reactive, cornea wnl.  Neck: Supple, no nodes adenopathy, no NVD or carotid bruit or thyromegaly.  CARDIOVASCULAR: Normal S1 and S2, No murmur, rub, gallop or lift.   LUNGS: No rales, rhonchi or wheeze. Normal breath sounds bilaterally.  ABDOMEN: Soft, nontender without mass or organomegaly. bowel sounds normoactive.  EXTREMITIES: No clubbing, cyanosis or edema. Distal pulses wnl.   SKIN: warm and dry with normal turgor.  NEURO: Alert/oriented x 3/normal motor exam. No pathologic reflexes.    PSYCH: normal affect.        LABS:                        10.9   5.3   )-----------( 299      ( 28 Mar 2017 14:47 )             32.6     28 Mar 2017 14:47    136    |  102    |  13.0   ----------------------------<  94     4.0     |  21.0   |  0.58     Ca    8.7        28 Mar 2017 14:47  Mg     2.0       28 Mar 2017 14:47    TPro  8.8    /  Alb  3.4    /  TBili  0.2    /  DBili  x      /  AST  18     /  ALT  10     /  AlkPhos  85     28 Mar 2017 14:47    CARLOS EDUARDO MIRELES  CARDIAC MARKERS ( 28 Mar 2017 14:47 )  x     / <0.01 ng/mL / x     / x     / x            Urinalysis Basic - ( 28 Mar 2017 14:47 )    Color: Yellow / Appearance: Clear / S.010 / pH: x  Gluc: x / Ketone: Negative  / Bili: Negative / Urobili: Negative mg/dL   Blood: x / Protein: Negative mg/dL / Nitrite: Negative   Leuk Esterase: Negative / RBC: x / WBC x   Sq Epi: x / Non Sq Epi: x / Bacteria: x        RADIOLOGY & ADDITIONAL STUDIES:    INTERPRETATION OF TELEMETRY (personally reviewed): No events    ECG:NS @ 120       Assessment and Plan:  In summary, CARLOS EDUARDO MIRELES is an 57y Female with past medical history significant for sjogrens syndrome with peripheral neuropathy, chronic inflammatory demyelinating polyneuritis, restless leg syndrome, dysautonomia, GI dysmotility, Cushings, Postural orthostatic hypotension (on midodrine), PVD, Normal Echo and PET as per chart in 2015 patient has prior multiple hospitalizations for unresponsiveness/ syncope. Pt was at Neurologist office today and noted to have multiple episodes of unresponsiveness. Patient is forgetful and not able to remember the events of today. [facility]                                   [address] [place] [address]                                                      PHONE: [phone]                                                         FAX: [phone]  [email or web address]  ---------------------------------------------------------------------------------------------------------------------------------    Reason for Consult: Syncope  CVS: Lei/Evens  HPI:  CARLOS EDUARDO MIRELES is an 57y Female sjogrens syndrome with peripheral neuropathy, chronic inflammatory demyelinating polyneuritis, restless leg syndrome, dysautonomia, GI dysmotility, Cushings, Postural orthostatic hypotension (on midodrine), PVD, Normal Echo and PET as per chart in 2015 patient has prior multiple hospitalizations for unresponsiveness/ syncope. Pt was at Neurologist office today and noted to have multiple episodes of unresponsiveness. Patient is forgetful and not able to remember the events of today.    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    Vital Signs Last 24 Hrs  T(C): 36.4, Max: 36.4 ( @ 11:42)  T(F): 97.6, Max: 97.6 ( @ 11:42)  HR: 122 (122 - 122)  BP: 117/68 (117/68 - 117/68)  BP(mean): --  RR: 20 (20 - 20)  SpO2: 98% (98% - 98%)  ICU Vital Signs Last 24 Hrs  CARLOS EDUARDO MIRELES  I&O's Detail    I&O's Summary    Drug Dosing Weight  CARLOS EDUARDO ALINE      PHYSICAL EXAM:  General: Appears well developed, well nourished alert and cooperative.  HEENT: Head; normocephalic, atraumatic.  Eyes: Pupils reactive, cornea wnl.  Neck: Supple, no nodes adenopathy, no NVD or carotid bruit or thyromegaly.  CARDIOVASCULAR: Normal S1 and S2, No murmur, rub, gallop or lift.   LUNGS: No rales, rhonchi or wheeze. Normal breath sounds bilaterally.  ABDOMEN: Soft, nontender without mass or organomegaly. bowel sounds normoactive.  EXTREMITIES: No clubbing, cyanosis or edema. Distal pulses wnl.   SKIN: warm and dry with normal turgor.  NEURO: Alert/oriented x 3/normal motor exam. No pathologic reflexes.    PSYCH: normal affect.        LABS:                        10.9   5.3   )-----------( 299      ( 28 Mar 2017 14:47 )             32.6     28 Mar 2017 14:47    136    |  102    |  13.0   ----------------------------<  94     4.0     |  21.0   |  0.58     Ca    8.7        28 Mar 2017 14:47  Mg     2.0       28 Mar 2017 14:47    TPro  8.8    /  Alb  3.4    /  TBili  0.2    /  DBili  x      /  AST  18     /  ALT  10     /  AlkPhos  85     28 Mar 2017 14:47    CARLOS EDUARDO MIRELES  CARDIAC MARKERS ( 28 Mar 2017 14:47 )  x     / <0.01 ng/mL / x     / x     / x            Urinalysis Basic - ( 28 Mar 2017 14:47 )    Color: Yellow / Appearance: Clear / S.010 / pH: x  Gluc: x / Ketone: Negative  / Bili: Negative / Urobili: Negative mg/dL   Blood: x / Protein: Negative mg/dL / Nitrite: Negative   Leuk Esterase: Negative / RBC: x / WBC x   Sq Epi: x / Non Sq Epi: x / Bacteria: x        RADIOLOGY & ADDITIONAL STUDIES:    INTERPRETATION OF TELEMETRY (personally reviewed): No events    ECG: NS @ 120 no acute ischemic changes      Assessment and Plan:  In summary, CARLOS EDUARDO MIRELES is an 57y Female with past medical history significant for sjogrens syndrome with peripheral neuropathy, chronic inflammatory demyelinating polyneuritis, restless leg syndrome, dysautonomia, GI dysmotility, Cushings, Postural orthostatic hypotension (on midodrine), PVD, Normal Echo and PET as per chart in 2015 patient has prior multiple hospitalizations for unresponsiveness/ syncope. Pt was at Neurologist office today and noted to have multiple episodes of unresponsiveness. Patient is forgetful and not able to remember the events of today.    Syncope of unclear origin    1) Monitor on Tele, orthostatics  2) ECHO  3) May consider ILR if recurrent syncope  4) QT falsely prolonged secondary to P wave

## 2017-03-28 NOTE — H&P ADULT - PROBLEM SELECTOR PLAN 2
Electrolytes stable, magnesium 2.0, s/p 2G magnesium in the ER will repeat EKG thereafter and daily.   -Holding seroquel/remeron for now until QTc goes back to wnl  -Cardiology consulted Electrolytes stable, magnesium 2.0, s/p 2G magnesium in the ER will repeat EKG thereafter and daily.   -Holding seroquel/remeron for now until QTc goes back to wnl  -ekg daily  -f/u labs in am   -Cardiology consulted

## 2017-03-28 NOTE — ED PROVIDER NOTE - CARE PLAN
Principal Discharge DX:	Syncope, unspecified syncope type Principal Discharge DX:	Syncope, unspecified syncope type  Secondary Diagnosis:	Prolonged QT interval

## 2017-03-28 NOTE — ED PROVIDER NOTE - MEDICAL DECISION MAKING DETAILS
near syncope seen hear by neuro Dr Robertson and he states he soke to staff that witnessed sz in his office and pt did not have clear cut sz but more like near syncope and he will follow for neuro

## 2017-03-28 NOTE — ED PROVIDER NOTE - OBJECTIVE STATEMENT
56 y/o female was brought in by ambulance after she was receiving an infusion and she had a brief one minutes sz and pt does not recall this but feels a little confused and does not have any complaints

## 2017-03-29 DIAGNOSIS — N39.0 URINARY TRACT INFECTION, SITE NOT SPECIFIED: ICD-10-CM

## 2017-03-29 LAB
ALBUMIN SERPL ELPH-MCNC: 3.6 G/DL — SIGNIFICANT CHANGE UP (ref 3.3–5.2)
ALP SERPL-CCNC: 100 U/L — SIGNIFICANT CHANGE UP (ref 40–120)
ALT FLD-CCNC: 10 U/L — SIGNIFICANT CHANGE UP
ANION GAP SERPL CALC-SCNC: 13 MMOL/L — SIGNIFICANT CHANGE UP (ref 5–17)
APPEARANCE UR: ABNORMAL
AST SERPL-CCNC: 17 U/L — SIGNIFICANT CHANGE UP
BACTERIA # UR AUTO: ABNORMAL
BASOPHILS # BLD AUTO: 0 K/UL — SIGNIFICANT CHANGE UP (ref 0–0.2)
BASOPHILS NFR BLD AUTO: 0.6 % — SIGNIFICANT CHANGE UP (ref 0–2)
BILIRUB SERPL-MCNC: 0.1 MG/DL — LOW (ref 0.4–2)
BILIRUB UR-MCNC: NEGATIVE — SIGNIFICANT CHANGE UP
BUN SERPL-MCNC: 13 MG/DL — SIGNIFICANT CHANGE UP (ref 8–20)
CALCIUM SERPL-MCNC: 8.7 MG/DL — SIGNIFICANT CHANGE UP (ref 8.6–10.2)
CHLORIDE SERPL-SCNC: 102 MMOL/L — SIGNIFICANT CHANGE UP (ref 98–107)
CO2 SERPL-SCNC: 21 MMOL/L — LOW (ref 22–29)
COLOR SPEC: YELLOW — SIGNIFICANT CHANGE UP
CREAT SERPL-MCNC: 0.56 MG/DL — SIGNIFICANT CHANGE UP (ref 0.5–1.3)
DIFF PNL FLD: ABNORMAL
EPI CELLS # UR: SIGNIFICANT CHANGE UP
GLUCOSE SERPL-MCNC: 109 MG/DL — SIGNIFICANT CHANGE UP (ref 70–115)
GLUCOSE UR QL: NEGATIVE MG/DL — SIGNIFICANT CHANGE UP
HCT VFR BLD CALC: 34.1 % — LOW (ref 37–47)
HGB BLD-MCNC: 11.4 G/DL — LOW (ref 12–16)
KETONES UR-MCNC: NEGATIVE — SIGNIFICANT CHANGE UP
LEUKOCYTE ESTERASE UR-ACNC: ABNORMAL
LYMPHOCYTES # BLD AUTO: 1.6 K/UL — SIGNIFICANT CHANGE UP (ref 1–4.8)
LYMPHOCYTES # BLD AUTO: 23.3 % — SIGNIFICANT CHANGE UP (ref 20–55)
MAGNESIUM SERPL-MCNC: 2.2 MG/DL — SIGNIFICANT CHANGE UP (ref 1.8–2.5)
MCHC RBC-ENTMCNC: 32.7 PG — HIGH (ref 27–31)
MCHC RBC-ENTMCNC: 33.4 G/DL — SIGNIFICANT CHANGE UP (ref 32–36)
MCV RBC AUTO: 97.7 FL — SIGNIFICANT CHANGE UP (ref 81–99)
MONOCYTES # BLD AUTO: 0.6 K/UL — SIGNIFICANT CHANGE UP (ref 0–0.8)
MONOCYTES NFR BLD AUTO: 8.2 % — SIGNIFICANT CHANGE UP (ref 3–10)
NEUTROPHILS # BLD AUTO: 4.5 K/UL — SIGNIFICANT CHANGE UP (ref 1.8–8)
NEUTROPHILS NFR BLD AUTO: 67.8 % — SIGNIFICANT CHANGE UP (ref 37–73)
NITRITE UR-MCNC: NEGATIVE — SIGNIFICANT CHANGE UP
PH UR: 6 — SIGNIFICANT CHANGE UP (ref 4.8–8)
PHOSPHATE SERPL-MCNC: 3.7 MG/DL — SIGNIFICANT CHANGE UP (ref 2.4–4.7)
PLATELET # BLD AUTO: 335 K/UL — SIGNIFICANT CHANGE UP (ref 150–400)
POTASSIUM SERPL-MCNC: 4.1 MMOL/L — SIGNIFICANT CHANGE UP (ref 3.5–5.3)
POTASSIUM SERPL-SCNC: 4.1 MMOL/L — SIGNIFICANT CHANGE UP (ref 3.5–5.3)
PROT SERPL-MCNC: 8.4 G/DL — SIGNIFICANT CHANGE UP (ref 6.6–8.7)
PROT UR-MCNC: 30 MG/DL
RBC # BLD: 3.49 M/UL — LOW (ref 4.4–5.2)
RBC # FLD: 18 % — HIGH (ref 11–15.6)
RBC CASTS # UR COMP ASSIST: ABNORMAL /HPF (ref 0–4)
SODIUM SERPL-SCNC: 136 MMOL/L — SIGNIFICANT CHANGE UP (ref 135–145)
SP GR SPEC: 1.02 — SIGNIFICANT CHANGE UP (ref 1.01–1.02)
T3 SERPL-MCNC: 57 NG/DL — LOW (ref 80–200)
T4 AB SER-ACNC: 2.6 UG/DL — LOW (ref 4.5–12)
UROBILINOGEN FLD QL: NEGATIVE MG/DL — SIGNIFICANT CHANGE UP
WBC # BLD: 6.7 K/UL — SIGNIFICANT CHANGE UP (ref 4.8–10.8)
WBC # FLD AUTO: 6.7 K/UL — SIGNIFICANT CHANGE UP (ref 4.8–10.8)
WBC UR QL: ABNORMAL

## 2017-03-29 PROCEDURE — 95819 EEG AWAKE AND ASLEEP: CPT | Mod: 26

## 2017-03-29 PROCEDURE — 99233 SBSQ HOSP IP/OBS HIGH 50: CPT

## 2017-03-29 PROCEDURE — 93010 ELECTROCARDIOGRAM REPORT: CPT

## 2017-03-29 RX ORDER — CEFTRIAXONE 500 MG/1
INJECTION, POWDER, FOR SOLUTION INTRAMUSCULAR; INTRAVENOUS
Qty: 0 | Refills: 0 | Status: DISCONTINUED | OUTPATIENT
Start: 2017-03-29 | End: 2017-04-02

## 2017-03-29 RX ORDER — CEFTRIAXONE 500 MG/1
1 INJECTION, POWDER, FOR SOLUTION INTRAMUSCULAR; INTRAVENOUS EVERY 24 HOURS
Qty: 0 | Refills: 0 | Status: DISCONTINUED | OUTPATIENT
Start: 2017-03-30 | End: 2017-04-02

## 2017-03-29 RX ORDER — CEFTRIAXONE 500 MG/1
1 INJECTION, POWDER, FOR SOLUTION INTRAMUSCULAR; INTRAVENOUS ONCE
Qty: 0 | Refills: 0 | Status: COMPLETED | OUTPATIENT
Start: 2017-03-29 | End: 2017-03-29

## 2017-03-29 RX ORDER — DIPHENHYDRAMINE HCL 50 MG
25 CAPSULE ORAL ONCE
Qty: 0 | Refills: 0 | Status: COMPLETED | OUTPATIENT
Start: 2017-03-29 | End: 2017-03-29

## 2017-03-29 RX ORDER — KETOROLAC TROMETHAMINE 30 MG/ML
30 SYRINGE (ML) INJECTION ONCE
Qty: 0 | Refills: 0 | Status: DISCONTINUED | OUTPATIENT
Start: 2017-03-29 | End: 2017-03-29

## 2017-03-29 RX ORDER — SODIUM CHLORIDE 9 MG/ML
1000 INJECTION INTRAMUSCULAR; INTRAVENOUS; SUBCUTANEOUS
Qty: 0 | Refills: 0 | Status: DISCONTINUED | OUTPATIENT
Start: 2017-03-29 | End: 2017-03-30

## 2017-03-29 RX ADMIN — Medication 30 MILLIGRAM(S): at 23:05

## 2017-03-29 RX ADMIN — MIDODRINE HYDROCHLORIDE 5 MILLIGRAM(S): 2.5 TABLET ORAL at 13:09

## 2017-03-29 RX ADMIN — FLUDROCORTISONE ACETATE 0.1 MILLIGRAM(S): 0.1 TABLET ORAL at 05:39

## 2017-03-29 RX ADMIN — SODIUM CHLORIDE 75 MILLILITER(S): 9 INJECTION INTRAMUSCULAR; INTRAVENOUS; SUBCUTANEOUS at 16:29

## 2017-03-29 RX ADMIN — CEFTRIAXONE 100 GRAM(S): 500 INJECTION, POWDER, FOR SOLUTION INTRAMUSCULAR; INTRAVENOUS at 13:09

## 2017-03-29 RX ADMIN — Medication 50 MICROGRAM(S): at 05:39

## 2017-03-29 RX ADMIN — Medication 25 MILLIGRAM(S): at 23:50

## 2017-03-29 NOTE — PROGRESS NOTE ADULT - PROBLEM SELECTOR PLAN 2
Pt complaining of urinary frequency/burning   UA positive and Ucx shows >100,000 GNR   -c/w rocephin 1g IVPB QD D#1

## 2017-03-29 NOTE — CHART NOTE - NSCHARTNOTEFT_GEN_A_CORE
CTA chest/ abdomen urgent and medically needed. Patient understands the procedure and risks involved and wants to proceed with the test,

## 2017-03-29 NOTE — PROGRESS NOTE ADULT - PROBLEM SELECTOR PLAN 1
F/u EEg to r/o sz.  F/u MRI brain with and without ela and MRA head without ela.  Consider cardiology.  DVT prophylaxis.  Will follow.

## 2017-03-29 NOTE — PATIENT PROFILE ADULT. - LEARNING ASSESSMENT (PATIENT) ADDITIONAL COMMENTS
Tips for safer surgery and pre op instructions given.  Pt demonstrates understanding of instructions.

## 2017-03-29 NOTE — PATIENT PROFILE ADULT. - PSH
Adrenal cortex disease  partial adrenal gland removed   H/O foot surgery  left ankle   S/P appendectomy    S/P  section    S/P cholecystectomy    S/P tubal ligation  and ablation    infusive port 2015

## 2017-03-29 NOTE — PROGRESS NOTE ADULT - SUBJECTIVE AND OBJECTIVE BOX
Patient is a 57y old  Female who presents with a chief complaint of "Bleeding" (29 Mar 2017 05:06)      HEALTH ISSUES - PROBLEM Dx:  Prophylactic measure: Prophylactic measure  Hypothyroidism: Hypothyroidism  Prolonged QT interval: Prolonged QT interval  Seizure: Seizure  Syncope due to orthostatic hypotension: Syncope due to orthostatic hypotension      INTERVAL HPI/OVERNIGHT EVENTS:  Patient seen and examined at bedside. Was informed by vanessa Snowden that cardiologist Dr. Cruz called and wanted an urgent CTA chest to completed b/c of a concern for dissection b/c of an abnormality found on the ECHO. D/w patient in regards to the risk and benefit of having the CTA chest/abd performed and she agreed. D/w in regards to a possible contrast dye allergy and she states that she just feels a warm sensation when she receives dye and has received dye in the past, no actual allergy/rash or anaphylaxis. Patient states she is feeling well, has been ambulating to the bathroom without any complications. No episodes of syncope overnight, but remained orthostatic positive. Patient denies chest pain, SOB, abd pain, N/V, fever, chills, dysuria or any other complaints. All remainder ROS negative.     Vital Signs Last 24 Hrs  T(C): 36.8, Max: 36.8 (- @ 21:31)  T(F): 98.2, Max: 98.3 (- @ 10:24)  HR: 80 (72 - 100)  BP: 137/80 (85/65 - 137/80)  BP(mean): --  RR: 20 (20 - 20)  SpO2: 100% (97% - 100%)        CONSTITUTIONAL: Well appearing, Thin, awake, alert and in no apparent distress  ENMT: Airway patent, Nasal mucosa clear. Mouth with normal mucosa. Throat has no vesicles, no oropharyngeal exudates and uvula is midline.  EYES: Clear bilaterally, pupils equal, round and reactive to light. EOMI.  CARDIAC: Normal rate, regular rhythm.  Heart sounds S1, S2.  No murmurs, rubs or gallops   RESPIRATORY: Breath sounds clear and equal bilaterally. No wheezes, rhales or rhonchi  ABDOMEN: Soft, NT ND BS+  MUSCULOSKELETAL: Spine appears normal, range of motion is not limited, no muscle or joint tenderness  EXTREMITIES: No edema, cyanosis or deformity   NEUROLOGICAL: Alert and oriented, no focal deficits, no motor or sensory deficits.  SKIN: No rash, skin turgor     MEDICATIONS  (STANDING):  midodrine 5milliGRAM(s) Oral daily  levothyroxine 50MICROGram(s) Oral daily  fludroCORTISONE 0.1milliGRAM(s) Oral daily  cefTRIAXone   IVPB  IV Intermittent   sodium chloride 0.9%. 1000milliLiter(s) IV Continuous <Continuous>    MEDICATIONS  (PRN):  oxyCODONE  5 mG/acetaminophen 325 mG 1Tablet(s) Oral every 6 hours PRN Moderate Pain (4 - 6)      LABS:                        11.4   6.7   )-----------( 335      ( 29 Mar 2017 14:07 )             34.1     29 Mar 2017 14:07    136    |  102    |  13.0   ----------------------------<  109    4.1     |  21.0   |  0.56     Ca    8.7        29 Mar 2017 14:07  Phos  3.7       29 Mar 2017 14:07  Mg     2.2       29 Mar 2017 14:07    TPro  8.4    /  Alb  3.6    /  TBili  0.1    /  DBili  x      /  AST  17     /  ALT  10     /  AlkPhos  100    29 Mar 2017 14:07      Urinalysis Basic - ( 29 Mar 2017 10:30 )    Color: Yellow / Appearance: Slightly Turbid / S.025 / pH: x  Gluc: x / Ketone: Negative  / Bili: Negative / Urobili: Negative mg/dL   Blood: x / Protein: 30 mg/dL / Nitrite: Negative   Leuk Esterase: Moderate / RBC: 3-5 /HPF / WBC 26-50   Sq Epi: x / Non Sq Epi: Occasional / Bacteria: Many      LIVER FUNCTIONS - ( 29 Mar 2017 14:07 )  Alb: 3.6 g/dL / Pro: 8.4 g/dL / ALK PHOS: 100 U/L / ALT: 10 U/L / AST: 17 U/L / GGT: x             RADIOLOGY & ADDITIONAL TESTS:  carotid duplex: IMPRESSION:    1.  Right carotid artery: No evidence of hemodynamically significant   stenosis.   2.  Left carotid artery: No evidence of hemodynamically significant   stenosis.  3.  Vertebral arteries: Antegrade flow.      ECHO:  Summary:   1. Technically adequate study.   2. Normal global left ventricular systolic function.   3. Left ventricular ejection fraction, by visual estimation, is 60 to   65%. LVEF by biplane MOD is 63%.   4. Thickening of the anterior and posterior mitral valve leaflets.   5. Trace mitral valve regurgitation.   6. Tubular structure seen arising from the left coronary cusp of the   aortic valve. This is most likely the ostium of left main coronary   artery. Clinical correlation and further imaging with CTA is advised.   7. There is no evidence of pericardial effusion.   8. Dr. Snowden was advised of the above findings.

## 2017-03-29 NOTE — PROGRESS NOTE ADULT - PROBLEM SELECTOR PLAN 1
patient has extensive history of dysautonomia and known orthostatic hypotension with multiple prior hospitalizations, unlikely seizure, concern for arrythmia   -Pt remains orthostatic positive -avoid all medications which can worsen orthostasis    -c/w IVF  -repeat orthostatics daily   -c/w telemetry to monitor for arrhythmias   -CT head negative for any findings   -carotid duplex  negative for any findings and EEG performed pending results   -Neurology f/u noted and appreciated and will order MRI/MRA head    -ECHO as stated above with concerning abnormality, f/u CTA chest/abdomen for better evaluation   -Cardiology follow up noted and appreciated

## 2017-03-29 NOTE — PROGRESS NOTE ADULT - SUBJECTIVE AND OBJECTIVE BOX
Chief Complaint: recurrent syncope    HPI: 58 yo female with witnessed episodes of unresponiveness/syncope. She was in neuro office and sent here. PMH + orthostatic hypotensions and dysautonomia/Sjogren;s/polyneuropathy/hypothyroidism/hx of adrenalectomy for cushing's/g/ap. Light smoker/no etoh. No allergy. Has had a negative NI cardiac kearney in past.    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    Vital Signs Last 24 Hrs  T(C): 36.7, Max: 36.8 (-28 @ 21:31)  T(F): 98.1, Max: 98.2 (03-28 @ 21:31)  HR: 72 (72 - 122)  BP: 80 systolic by palp left arm manually  BP(mean): --  RR: 12 (20 - 20)  SpO2: 97% (97% - 98%)    PHYSICAL EXAM:  General: Appears well developed, well nourished alert and cooperative. Middle-aged female looks older than age.  HEENT: Head; normocephalic, atraumatic.  Eyes;   Pupils reactive, cornea wnl.  Neck; Supple, no nodes adenopathy, no NVD or carotid bruit or thyromegaly.  CARDIOVASCULAR; No murmur, rub, gallop or lift. Normal S1 and S2.  LUNGS; No rales, rhonchi or wheeze. Normal breath sounds bilaterally.  ABDOMEN ; Soft, nontender without mass or organomegaly. bowel sounds normoactive.  EXTREMITIES; No clubbing, cyanosis or edema. Distal pulses wnl. ROM normal.  SKIN; warm and dry with normal turgor.  NEURO; Alert/oriented x 3/normal motor exam. No pathologic reflexes.    PSYCH; normal affect.            INTERPRETATION OF TELEMETRY:    ECG:SR 80 wnl.    I&O's Detail      LABS:                        10.9   5.3   )-----------( 299      ( 28 Mar 2017 14:47 )             32.6     28 Mar 2017 14:47    136    |  102    |  13.0   ----------------------------<  94     4.0     |  21.0   |  0.58     Ca    8.7        28 Mar 2017 14:47  Mg     2.0       28 Mar 2017 14:47    TPro  8.8    /  Alb  3.4    /  TBili  0.2    /  DBili  x      /  AST  18     /  ALT  10     /  AlkPhos  85     28 Mar 2017 14:47    CARDIAC MARKERS ( 28 Mar 2017 14:47 )  x     / <0.01 ng/mL / x     / x     / x            Urinalysis Basic - ( 28 Mar 2017 14:47 )    Color: Yellow / Appearance: Clear / S.010 / pH: x  Gluc: x / Ketone: Negative  / Bili: Negative / Urobili: Negative mg/dL   Blood: x / Protein: Negative mg/dL / Nitrite: Negative   Leuk Esterase: Negative / RBC: x / WBC x   Sq Epi: x / Non Sq Epi: x / Bacteria: x      I&O's Summary      RADIOLOGY & ADDITIONAL STUDIES:

## 2017-03-29 NOTE — PATIENT PROFILE ADULT. - PMH
Cushings syndrome    Lupus    POTS (postural orthostatic tachycardia syndrome)    PVD (peripheral vascular disease)    Sjogren-Addy syndrome  herniated disc, thorasic

## 2017-03-29 NOTE — PROGRESS NOTE ADULT - SUBJECTIVE AND OBJECTIVE BOX
INTERVAL HISTORY:  Stable and seen bedside.  No new changes.  Feels better. No recurrent syncopal event since admitted here.    contrast media (iodine-based) (Rash)  ertapenem (Other)      VITAL SIGNS:  Vital Signs Last 24 Hrs  T(C): 36.8, Max: 36.8 (03-28 @ 21:31)  T(F): 98.3, Max: 98.3 (03-29 @ 10:24)  HR: 100 (72 - 122)  BP: 86/55 (85/65 - 117/68)  BP(mean): --  RR: 20 (20 - 20)  SpO2: 100% (97% - 100%)    PHYSICAL EXAMINATION:  General: Well-developed, well nourished, in no acute distress.  Eyes: Conjunctiva and sclera clear.  Neurologic:  - Mental Status:  Alert, awake, oriented to person, place, and time; Speech is normal; Affect is normal.  - Cranial Nerves: II-XI intact.  - Motor:  Strength is 5/5 throughout.  There is no pronator drift.  Normal muscle bulk and tone throughout.  - Reflexes:  2+ throughout  - Sensory:  Intact to light touch, pin prick, vibration, and joint-position sense throughout.  - Coordination:  No dysmetria/dysdiadochokinesis.    MEDS:  MEDICATIONS  (STANDING):  midodrine 5milliGRAM(s) Oral daily  levothyroxine 50MICROGram(s) Oral daily  fludroCORTISONE 0.1milliGRAM(s) Oral daily    MEDICATIONS  (PRN):      LABS:                          10.9   5.3   )-----------( 299      ( 28 Mar 2017 14:47 )             32.6     28 Mar 2017 14:47    136    |  102    |  13.0   ----------------------------<  94     4.0     |  21.0   |  0.58     Ca    8.7        28 Mar 2017 14:47  Mg     2.0       28 Mar 2017 14:47    TPro  8.8    /  Alb  3.4    /  TBili  0.2    /  DBili  x      /  AST  18     /  ALT  10     /  AlkPhos  85     28 Mar 2017 14:47    LIVER FUNCTIONS - ( 28 Mar 2017 14:47 )  Alb: 3.4 g/dL / Pro: 8.8 g/dL / ALK PHOS: 85 U/L / ALT: 10 U/L / AST: 18 U/L / GGT: x               RADIOLOGY & ADDITIONAL STUDIES:      US Carotid 3/28/17 - no evidence of severe hemodynamic significant stenosis.  CT head 3/28/17 - Mild chronic microvascular changes.  No evidence of cva/mass/ich.      IMPRESSION: Syncope in view of dysautonomia with Sjogrens.

## 2017-03-29 NOTE — PROGRESS NOTE ADULT - ASSESSMENT
1. syncope and loc -multiple episodes over the yrs-hx POTS/dysautonomia and postural hypotenison. No arrhythmias have been seen. Echo show s structurally nl cor.  2. hypothyroid  3.anemia  4. sjogren's

## 2017-03-29 NOTE — PROGRESS NOTE ADULT - PROBLEM SELECTOR PLAN 4
TSH: 5.8 elevated   -free t4 low  likely synthroid dosage is too low will increase synthroid from 50mcg to 75mcg po qd

## 2017-03-30 LAB
-  AMIKACIN: SIGNIFICANT CHANGE UP
-  AMPICILLIN/SULBACTAM: SIGNIFICANT CHANGE UP
-  AMPICILLIN: SIGNIFICANT CHANGE UP
-  AZTREONAM: SIGNIFICANT CHANGE UP
-  CEFAZOLIN: SIGNIFICANT CHANGE UP
-  CEFEPIME: SIGNIFICANT CHANGE UP
-  CEFOXITIN: SIGNIFICANT CHANGE UP
-  CEFTAZIDIME: SIGNIFICANT CHANGE UP
-  CEFTRIAXONE: SIGNIFICANT CHANGE UP
-  CIPROFLOXACIN: SIGNIFICANT CHANGE UP
-  ERTAPENEM: SIGNIFICANT CHANGE UP
-  GENTAMICIN: SIGNIFICANT CHANGE UP
-  IMIPENEM: SIGNIFICANT CHANGE UP
-  LEVOFLOXACIN: SIGNIFICANT CHANGE UP
-  MEROPENEM: SIGNIFICANT CHANGE UP
-  NITROFURANTOIN: SIGNIFICANT CHANGE UP
-  PIPERACILLIN/TAZOBACTAM: SIGNIFICANT CHANGE UP
-  TOBRAMYCIN: SIGNIFICANT CHANGE UP
-  TRIMETHOPRIM/SULFAMETHOXAZOLE: SIGNIFICANT CHANGE UP
CULTURE RESULTS: SIGNIFICANT CHANGE UP
METHOD TYPE: SIGNIFICANT CHANGE UP
ORGANISM # SPEC MICROSCOPIC CNT: SIGNIFICANT CHANGE UP
ORGANISM # SPEC MICROSCOPIC CNT: SIGNIFICANT CHANGE UP
SPECIMEN SOURCE: SIGNIFICANT CHANGE UP
T4 FREE SERPL-MCNC: 0.6 NG/DL — LOW (ref 0.9–1.8)

## 2017-03-30 PROCEDURE — 71275 CT ANGIOGRAPHY CHEST: CPT | Mod: 26

## 2017-03-30 PROCEDURE — 99233 SBSQ HOSP IP/OBS HIGH 50: CPT

## 2017-03-30 PROCEDURE — 74174 CTA ABD&PLVS W/CONTRAST: CPT | Mod: 26

## 2017-03-30 RX ORDER — OXYCODONE HYDROCHLORIDE 5 MG/1
5 TABLET ORAL ONCE
Qty: 0 | Refills: 0 | Status: DISCONTINUED | OUTPATIENT
Start: 2017-03-30 | End: 2017-03-30

## 2017-03-30 RX ORDER — SODIUM CHLORIDE 9 MG/ML
1000 INJECTION INTRAMUSCULAR; INTRAVENOUS; SUBCUTANEOUS
Qty: 0 | Refills: 0 | Status: DISCONTINUED | OUTPATIENT
Start: 2017-03-30 | End: 2017-04-02

## 2017-03-30 RX ORDER — LEVOTHYROXINE SODIUM 125 MCG
75 TABLET ORAL DAILY
Qty: 0 | Refills: 0 | Status: DISCONTINUED | OUTPATIENT
Start: 2017-03-31 | End: 2017-04-05

## 2017-03-30 RX ORDER — LEVETIRACETAM 250 MG/1
500 TABLET, FILM COATED ORAL
Qty: 0 | Refills: 0 | Status: DISCONTINUED | OUTPATIENT
Start: 2017-03-30 | End: 2017-04-05

## 2017-03-30 RX ADMIN — OXYCODONE HYDROCHLORIDE 5 MILLIGRAM(S): 5 TABLET ORAL at 03:55

## 2017-03-30 RX ADMIN — Medication 30 MILLIGRAM(S): at 03:57

## 2017-03-30 RX ADMIN — MIDODRINE HYDROCHLORIDE 5 MILLIGRAM(S): 2.5 TABLET ORAL at 11:36

## 2017-03-30 RX ADMIN — CEFTRIAXONE 100 GRAM(S): 500 INJECTION, POWDER, FOR SOLUTION INTRAMUSCULAR; INTRAVENOUS at 11:37

## 2017-03-30 RX ADMIN — FLUDROCORTISONE ACETATE 0.1 MILLIGRAM(S): 0.1 TABLET ORAL at 05:13

## 2017-03-30 RX ADMIN — LEVETIRACETAM 500 MILLIGRAM(S): 250 TABLET, FILM COATED ORAL at 17:50

## 2017-03-30 RX ADMIN — SODIUM CHLORIDE 75 MILLILITER(S): 9 INJECTION INTRAMUSCULAR; INTRAVENOUS; SUBCUTANEOUS at 11:37

## 2017-03-30 RX ADMIN — Medication 50 MICROGRAM(S): at 05:13

## 2017-03-30 NOTE — PROGRESS NOTE ADULT - SUBJECTIVE AND OBJECTIVE BOX
Patient is a 57y old  Female who presents with a chief complaint of "Bleeding" (29 Mar 2017 05:06)      HEALTH ISSUES - PROBLEM Dx:  UTI (urinary tract infection): UTI (urinary tract infection)  Prophylactic measure: Prophylactic measure  Hypothyroidism: Hypothyroidism  Prolonged QT interval: Prolonged QT interval  Seizure: Seizure  Syncope due to orthostatic hypotension: Syncope due to orthostatic hypotension      INTERVAL HPI/OVERNIGHT EVENTS:  Patient seen and examined at bedside. No acute events overnight. Patient states she is doing well, able to ambulate without any complications. D/w nurse and currently orthostasis improved. Patient denies chest pain, SOB, abd pain, N/V, fever, chills, dysuria or any other complaints. All remainder ROS negative.     Vital Signs Last 24 Hrs  T(C): 36.3, Max: 37.4 ( @ 20:41)  T(F): 97.3, Max: 99.4 ( @ 20:41)  HR: 86 (78 - 86)  BP: 112/73 (98/69 - 118/86)  BP(mean): --  RR: 20 (18 - 20)  SpO2: 98% (96% - 100%)    CONSTITUTIONAL: Well appearing, Thin, awake, alert and in no apparent distress  ENMT: Airway patent, Nasal mucosa clear. Mouth with normal mucosa. Throat has no vesicles, no oropharyngeal exudates and uvula is midline.  EYES: Clear bilaterally, pupils equal, round and reactive to light. EOMI.  CARDIAC: Normal rate, regular rhythm.  Heart sounds S1, S2.  No murmurs, rubs or gallops   RESPIRATORY: Breath sounds clear and equal bilaterally. No wheezes, rhales or rhonchi  ABDOMEN: Soft, NT ND BS+  EXTREMITIES: No edema, cyanosis or deformity     MEDICATIONS  (STANDING):  midodrine 5milliGRAM(s) Oral daily  levothyroxine 50MICROGram(s) Oral daily  fludroCORTISONE 0.1milliGRAM(s) Oral daily  cefTRIAXone   IVPB  IV Intermittent   cefTRIAXone   IVPB 1Gram(s) IV Intermittent every 24 hours  sodium chloride 0.9%. 1000milliLiter(s) IV Continuous <Continuous>  levETIRAcetam 500milliGRAM(s) Oral two times a day    MEDICATIONS  (PRN):  oxyCODONE  5 mG/acetaminophen 325 mG 1Tablet(s) Oral every 6 hours PRN Moderate Pain (4 - 6)      LABS:                        11.4   6.7   )-----------( 335      ( 29 Mar 2017 14:07 )             34.1     29 Mar 2017 14:07    136    |  102    |  13.0   ----------------------------<  109    4.1     |  21.0   |  0.56     Ca    8.7        29 Mar 2017 14:07  Phos  3.7       29 Mar 2017 14:07  Mg     2.2       29 Mar 2017 14:07    TPro  8.4    /  Alb  3.6    /  TBili  0.1    /  DBili  x      /  AST  17     /  ALT  10     /  AlkPhos  100    29 Mar 2017 14:07      Urinalysis Basic - ( 29 Mar 2017 10:30 )    Color: Yellow / Appearance: Slightly Turbid / S.025 / pH: x  Gluc: x / Ketone: Negative  / Bili: Negative / Urobili: Negative mg/dL   Blood: x / Protein: 30 mg/dL / Nitrite: Negative   Leuk Esterase: Moderate / RBC: 3-5 /HPF / WBC 26-50   Sq Epi: x / Non Sq Epi: Occasional / Bacteria: Many      LIVER FUNCTIONS - ( 29 Mar 2017 14:07 )  Alb: 3.6 g/dL / Pro: 8.4 g/dL / ALK PHOS: 100 U/L / ALT: 10 U/L / AST: 17 U/L / GGT: x             RADIOLOGY & ADDITIONAL TESTS:  CTA chest/abdomen:  IMPRESSION:      Calcific atherosclerosis. No aortic dissection or aneurysm.  Small bilateral pleural effusions.  Punctate nonobstructing left renal stone.      EEG:  IMPRESSION: This is an abnormal EEG recording. The most prominent finding   is paroxysmal sharp wave of generalized slowing potentially   epileptogenic.

## 2017-03-30 NOTE — PROGRESS NOTE ADULT - SUBJECTIVE AND OBJECTIVE BOX
[facility]                                   [address] [place] [address]                                                      PHONE: [phone]                                                         FAX: [phone]  -------------------------------------------------------------------------------------------------------------------------------    Pt seen and examined. FU for syncope    Overnight events/patient complaints: Pt without complains.    Vital Signs Last 24 Hrs  T(C): 36.3, Max: 37.4 (03-29 @ 20:41)  T(F): 97.3, Max: 99.4 (03-29 @ 20:41)  HR: 84 (78 - 84)  BP: 118/86 (98/69 - 137/80)  BP(mean): --  RR: 18 (18 - 20)  SpO2: 96% (96% - 100%)  I&O's Summary      PHYSICAL EXAM:  Constitutional: Oriented to time, place and person. Appears well developed, well nourished; alert and co-operative  HEENT:     Conjunctiva normal, Normal oral mucosa, No JVD	  Cardiovascular: Normal S1 S2, No murmurs  Respiratory: Lungs clear to auscultation; no crepitations, no wheeze  Gastrointestinal:  Soft, Non-tender, + BS	  Extremities: No cyanosis, clubbing or edema  Skin: Warm and dry  Neurologic: Alert oriented to time place and person  Psychiatric: affect was normal        LABS:                        11.4   6.7   )-----------( 335      ( 29 Mar 2017 14:07 )             34.1     29 Mar 2017 14:07    136    |  102    |  13.0   ----------------------------<  109    4.1     |  21.0   |  0.56     Ca    8.7        29 Mar 2017 14:07  Phos  3.7       29 Mar 2017 14:07  Mg     2.2       29 Mar 2017 14:07    TPro  8.4    /  Alb  3.6    /  TBili  0.1    /  DBili  x      /  AST  17     /  ALT  10     /  AlkPhos  100    29 Mar 2017 14:07    CARDIAC MARKERS ( 28 Mar 2017 14:47 )  x     / <0.01 ng/mL / x     / x     / x              RADIOLOGY & ADDITIONAL STUDIES:    CARDIOLOGY TESTING:     Telemetry: No arrhythmias    Echocardiogram:   1. Technically adequate study.   2. Normal global left ventricular systolic function.   3. Left ventricular ejection fraction, by visual estimation, is 60 to   65%. LVEF by biplane MOD is 63%.   4. Thickening of the anterior and posterior mitral valve leaflets.   5. Trace mitral valve regurgitation.   6. Tubular structure seen arising from the left coronary cusp of the   aortic valve. This is most likely the ostium of left main coronary   artery. Clinical correlation and further imaging with CTA is advised.   7. There is no evidence of pericardial effusion.   8. Dr. Mariee advised of the above findings.    MEDICATIONS:  MEDICATIONS  (STANDING):  midodrine 5milliGRAM(s) Oral daily  levothyroxine 50MICROGram(s) Oral daily  fludroCORTISONE 0.1milliGRAM(s) Oral daily  cefTRIAXone   IVPB  IV Intermittent   cefTRIAXone   IVPB 1Gram(s) IV Intermittent every 24 hours  sodium chloride 0.9%. 1000milliLiter(s) IV Continuous <Continuous>    MEDICATIONS  (PRN):  oxyCODONE  5 mG/acetaminophen 325 mG 1Tablet(s) Oral every 6 hours PRN Moderate Pain (4 - 6)      ASSESSMENT AND PLAN:    58 yo female with witnessed episodes of unresponiveness/syncope. She was in neuro office and sent here. PMH + orthostatic hypotensions and dysautonomia/Sjogren;s/polyneuropathy/hypothyroidism/hx of adrenalectomy for cushing's/g/ap.  Has had a negative NI cardiac kearney in past.    - No further cardiac work-up needed.  - Pls recall if any qns/concerns.  - Will set up for outpt FU in 1 month

## 2017-03-30 NOTE — PROGRESS NOTE ADULT - PROBLEM SELECTOR PLAN 1
patient has extensive history of dysautonomia and known orthostatic hypotension with multiple prior hospitalizations, unlikely seizure, concern for arrythmia   -Orthostasis improving  -avoid all medications which can worsen orthostasis    -c/w IVF  -repeat orthostatics daily   -c/w telemetry to monitor for arrhythmias   -CT head negative for any findings   -carotid duplex  negative for any findings   -EEG as above shows paroxysmal sharp wave of generalized slowing potentially   epileptogenic. Neurology f/u requested.   - MRI/MRA head  to be completed in the am b/c pt just received contrast for CTA chest/abdomen, will hydrate with IVF   -Cardiology follow up noted and appreciated and no further cardiac work up to be completed inpatient and pt to f/u cardiology in 1 month as an outpt.

## 2017-03-30 NOTE — PROGRESS NOTE ADULT - ASSESSMENT
56 F with PMH sjogrens syndrome with peripheral neuropathy, chronic inflammatory demyelinating polyneuritis, restless leg syndrome, dysautonomia, GI dysmotility, orthostatic hypotension (on midodrine), patient has prior multiple hospitalizations for unresponsiveness/ syncope as well as prior concern for self medication with narcotics/ benzos, currently presented s/p episode of unresponsiveness concern for syncope vs seizure, extensive hx of orthostatic hypotension now improving. Patient had abnormal ECHO for which she had CTA chest/abdomen performed with no evidence of any abnormalities. Patient also pending full neurological work up, pending MRI head/MRA to be hydrated with IVF today, received contrast yest and to have contrast for MRI in the am. EEG also showed abnormality of paroxysmal sharp wave of generalized slowing potentially epileptogenic and neurology f/u was requested.

## 2017-03-30 NOTE — PROGRESS NOTE ADULT - PROBLEM SELECTOR PLAN 2
Pt complaining of urinary frequency/burning, improving today   -UA positive and Ucx shows >100,000 E. Coli   -c/w rocephin 1g IVPB QD D#2

## 2017-03-31 LAB
ANION GAP SERPL CALC-SCNC: 9 MMOL/L — SIGNIFICANT CHANGE UP (ref 5–17)
BASOPHILS # BLD AUTO: 0.1 K/UL — SIGNIFICANT CHANGE UP (ref 0–0.2)
BASOPHILS NFR BLD AUTO: 1.6 % — SIGNIFICANT CHANGE UP (ref 0–2)
BUN SERPL-MCNC: 17 MG/DL — SIGNIFICANT CHANGE UP (ref 8–20)
CALCIUM SERPL-MCNC: 8.3 MG/DL — LOW (ref 8.6–10.2)
CHLORIDE SERPL-SCNC: 109 MMOL/L — HIGH (ref 98–107)
CO2 SERPL-SCNC: 21 MMOL/L — LOW (ref 22–29)
CORTIS AM PEAK SERPL-MCNC: 17.2 UG/DL — SIGNIFICANT CHANGE UP (ref 6–18.4)
CREAT SERPL-MCNC: 0.6 MG/DL — SIGNIFICANT CHANGE UP (ref 0.5–1.3)
GLUCOSE SERPL-MCNC: 85 MG/DL — SIGNIFICANT CHANGE UP (ref 70–115)
HCT VFR BLD CALC: 30.8 % — LOW (ref 37–47)
HGB BLD-MCNC: 10.4 G/DL — LOW (ref 12–16)
LYMPHOCYTES # BLD AUTO: 2.2 K/UL — SIGNIFICANT CHANGE UP (ref 1–4.8)
LYMPHOCYTES # BLD AUTO: 35.6 % — SIGNIFICANT CHANGE UP (ref 20–55)
MCHC RBC-ENTMCNC: 33 PG — HIGH (ref 27–31)
MCHC RBC-ENTMCNC: 33.8 G/DL — SIGNIFICANT CHANGE UP (ref 32–36)
MCV RBC AUTO: 97.8 FL — SIGNIFICANT CHANGE UP (ref 81–99)
MONOCYTES # BLD AUTO: 0.6 K/UL — SIGNIFICANT CHANGE UP (ref 0–0.8)
MONOCYTES NFR BLD AUTO: 9.9 % — SIGNIFICANT CHANGE UP (ref 3–10)
NEUTROPHILS # BLD AUTO: 3.3 K/UL — SIGNIFICANT CHANGE UP (ref 1.8–8)
NEUTROPHILS NFR BLD AUTO: 52.6 % — SIGNIFICANT CHANGE UP (ref 37–73)
PLATELET # BLD AUTO: 260 K/UL — SIGNIFICANT CHANGE UP (ref 150–400)
POTASSIUM SERPL-MCNC: 4 MMOL/L — SIGNIFICANT CHANGE UP (ref 3.5–5.3)
POTASSIUM SERPL-SCNC: 4 MMOL/L — SIGNIFICANT CHANGE UP (ref 3.5–5.3)
RBC # BLD: 3.15 M/UL — LOW (ref 4.4–5.2)
RBC # FLD: 17.4 % — HIGH (ref 11–15.6)
SODIUM SERPL-SCNC: 139 MMOL/L — SIGNIFICANT CHANGE UP (ref 135–145)
WBC # BLD: 6.3 K/UL — SIGNIFICANT CHANGE UP (ref 4.8–10.8)
WBC # FLD AUTO: 6.3 K/UL — SIGNIFICANT CHANGE UP (ref 4.8–10.8)

## 2017-03-31 PROCEDURE — 99233 SBSQ HOSP IP/OBS HIGH 50: CPT

## 2017-03-31 RX ADMIN — CEFTRIAXONE 100 GRAM(S): 500 INJECTION, POWDER, FOR SOLUTION INTRAMUSCULAR; INTRAVENOUS at 12:24

## 2017-03-31 RX ADMIN — LEVETIRACETAM 500 MILLIGRAM(S): 250 TABLET, FILM COATED ORAL at 05:53

## 2017-03-31 RX ADMIN — SODIUM CHLORIDE 75 MILLILITER(S): 9 INJECTION INTRAMUSCULAR; INTRAVENOUS; SUBCUTANEOUS at 12:24

## 2017-03-31 RX ADMIN — LEVETIRACETAM 500 MILLIGRAM(S): 250 TABLET, FILM COATED ORAL at 18:29

## 2017-03-31 RX ADMIN — MIDODRINE HYDROCHLORIDE 5 MILLIGRAM(S): 2.5 TABLET ORAL at 12:24

## 2017-03-31 RX ADMIN — SODIUM CHLORIDE 75 MILLILITER(S): 9 INJECTION INTRAMUSCULAR; INTRAVENOUS; SUBCUTANEOUS at 01:06

## 2017-03-31 RX ADMIN — Medication 75 MICROGRAM(S): at 05:53

## 2017-03-31 RX ADMIN — FLUDROCORTISONE ACETATE 0.1 MILLIGRAM(S): 0.1 TABLET ORAL at 05:53

## 2017-03-31 NOTE — PROGRESS NOTE ADULT - ASSESSMENT
56 F with PMH sjogrens syndrome with peripheral neuropathy, chronic inflammatory demyelinating polyneuritis, restless leg syndrome, dysautonomia, GI dysmotility, orthostatic hypotension (on midodrine), patient has prior multiple hospitalizations for unresponsiveness/ syncope as well as prior concern for self medication with narcotics/ benzos, currently presented s/p episode of unresponsiveness concern for syncope vs seizure, extensive hx of orthostatic hypotension now improving. Patient had abnormal ECHO for which she had CTA chest/abdomen performed with no evidence of any abnormalities. Patient also pending full neurological work up, pending MRI head/MRA to be hydrated with IVF today, received contrast 3/30/17and to have contrast for MRI today and c/w IVF. EEG also showed abnormality of paroxysmal sharp wave of generalized slowing potentially epileptogenic and neurology placed the patient on keppra. Patient continues with syncope work up.

## 2017-03-31 NOTE — PROGRESS NOTE ADULT - PROBLEM SELECTOR PLAN 1
patient has extensive history of dysautonomia and known orthostatic hypotension with multiple prior hospitalizations, orthostasis improved and found to have epileptogenic activity on EEG.  -Orthostasis daily  -avoid all medications which can worsen orthostasis    -c/w IVF     -c/w midodrine 5mg po qd   -pt has bought her northera from home c/w 100mg po bid   -cardio f/u noted and appreciated will d/c telemetry no further cardiac work up inpatient    -CT head negative for any findings   -carotid duplex  negative for any findings   -EEG as above shows paroxysmal sharp wave of generalized slowing potentially   epileptogenic. Neurology has started keppra 500mg po bid   - MRI/MRA head  to be completed today   -f/u cardiology in 1 month as an outpt. patient has extensive history of dysautonomia and known orthostatic hypotension with multiple prior hospitalizations, orthostasis improved and found to have epileptogenic activity on EEG.  -Orthostasis daily  -avoid all medications which can worsen orthostasis    -c/w IVF     -c/w midodrine 5mg po qd  -c/w florinef 0.1mg po qd   -pt has bought her northera from home c/w 100mg po bid   -cardio f/u noted and appreciated will d/c telemetry no further cardiac work up inpatient    -CT head negative for any findings   -carotid duplex  negative for any findings   -EEG as above shows paroxysmal sharp wave of generalized slowing potentially   epileptogenic. Neurology has started keppra 500mg po bid   - MRI/MRA head  to be completed today   -f/u cardiology in 1 month as an outpt.

## 2017-03-31 NOTE — PROGRESS NOTE ADULT - PROBLEM SELECTOR PLAN 2
Pt complaining of urinary frequency/burning, improving.   -UA positive and Ucx shows >100,000 E. Coli   -c/w rocephin 1g IVPB QD D#3

## 2017-03-31 NOTE — PROGRESS NOTE ADULT - SUBJECTIVE AND OBJECTIVE BOX
Patient is a 57y old  Female who presents with a chief complaint of "Bleeding" (29 Mar 2017 05:06)      HEALTH ISSUES - PROBLEM Dx:  UTI (urinary tract infection): UTI (urinary tract infection)  Prophylactic measure: Prophylactic measure  Hypothyroidism: Hypothyroidism  Prolonged QT interval: Prolonged QT interval  Seizure: Seizure  Syncope due to orthostatic hypotension: Syncope due to orthostatic hypotension      INTERVAL HPI/OVERNIGHT EVENTS:  Patient seen and examined at bedside. No acute events overnight. Patient states she is feeling well, felt slightly dizzy/light headed last night. Patient denies chest pain, SOB, abd pain, N/V, fever, chills, dysuria or any other complaints. All remainder ROS negative.     Vital Signs Last 24 Hrs  T(C): 37.2, Max: 37.2 (03-31 @ 12:17)  T(F): 99, Max: 99 (03-31 @ 12:17)  HR: 73 (70 - 86)  BP: 126/62 (96/56 - 126/62)  BP(mean): --  RR: 16 (16 - 20)  SpO2: 98% (97% - 98%)    CAPILLARY BLOOD GLUCOSE      I&O's Summary  I & Os for 24h ending 31 Mar 2017 07:00  =============================================  IN: 900 ml / OUT: 0 ml / NET: 900 ml    I & Os for current day (as of 31 Mar 2017 14:52)  =============================================  IN: 450 ml / OUT: 0 ml / NET: 450 ml      CONSTITUTIONAL: Well appearing, Thin, awake, alert and in no apparent distress  ENMT: Airway patent, Nasal mucosa clear. Mouth with normal mucosa. Throat has no vesicles, no oropharyngeal exudates and uvula is midline.  EYES: Clear bilaterally, pupils equal, round and reactive to light. EOMI.  CARDIAC: Normal rate, regular rhythm.  Heart sounds S1, S2.  No murmurs, rubs or gallops   RESPIRATORY: Breath sounds clear and equal bilaterally. No wheezes, rhales or rhonchi  ABDOMEN: Soft, NT ND BS+  MUSCULOSKELETAL: Spine appears normal, range of motion is not limited, no muscle or joint tenderness  EXTREMITIES: No edema, cyanosis or deformity   Skin turgor improving     MEDICATIONS  (STANDING):  midodrine 5milliGRAM(s) Oral daily  fludroCORTISONE 0.1milliGRAM(s) Oral daily  cefTRIAXone   IVPB  IV Intermittent   cefTRIAXone   IVPB 1Gram(s) IV Intermittent every 24 hours  sodium chloride 0.9%. 1000milliLiter(s) IV Continuous <Continuous>  levETIRAcetam 500milliGRAM(s) Oral two times a day  levothyroxine 75MICROGram(s) Oral daily    MEDICATIONS  (PRN):  oxyCODONE  5 mG/acetaminophen 325 mG 1Tablet(s) Oral every 6 hours PRN Moderate Pain (4 - 6)      LABS:                        10.4   6.3   )-----------( 260      ( 31 Mar 2017 06:12 )             30.8     31 Mar 2017 06:12    139    |  109    |  17.0   ----------------------------<  85     4.0     |  21.0   |  0.60     Ca    8.3        31 Mar 2017 06:12 Patient is a 57y old  Female who presents with a chief complaint of "Bleeding" (29 Mar 2017 05:06)      HEALTH ISSUES - PROBLEM Dx:  UTI (urinary tract infection): UTI (urinary tract infection)  Prophylactic measure: Prophylactic measure  Hypothyroidism: Hypothyroidism  Prolonged QT interval: Prolonged QT interval  Seizure: Seizure  Syncope due to orthostatic hypotension: Syncope due to orthostatic hypotension      INTERVAL HPI/OVERNIGHT EVENTS:  Patient seen and examined at bedside. No acute events overnight. Patient states she is feeling well, felt slightly dizzy/light headed last night. Patient denies chest pain, SOB, abd pain, N/V, fever, chills, dysuria or any other complaints. All remainder ROS negative.     Vital Signs Last 24 Hrs  T(C): 37.2, Max: 37.2 (03-31 @ 12:17)  T(F): 99, Max: 99 (03-31 @ 12:17)  HR: 73 (70 - 86)  BP: 126/62 (96/56 - 126/62)  BP(mean): --  RR: 16 (16 - 20)  SpO2: 98% (97% - 98%)    CAPILLARY BLOOD GLUCOSE      I&O's Summary  I & Os for 24h ending 31 Mar 2017 07:00  =============================================  IN: 900 ml / OUT: 0 ml / NET: 900 ml    I & Os for current day (as of 31 Mar 2017 14:52)  =============================================  IN: 450 ml / OUT: 0 ml / NET: 450 ml      CONSTITUTIONAL: Well appearing, Thin, awake, alert and in no apparent distress  ENMT: Airway patent, Nasal mucosa clear. Mouth with normal mucosa. Throat has no vesicles, no oropharyngeal exudates and uvula is midline.  EYES: Clear bilaterally, pupils equal, round and reactive to light. EOMI.  CARDIAC: Normal rate, regular rhythm.  Heart sounds S1, S2.  No murmurs, rubs or gallops   RESPIRATORY: Breath sounds clear and equal bilaterally. No wheezes, rhales or rhonchi  ABDOMEN: Soft, NT ND BS+  MUSCULOSKELETAL: Spine appears normal, range of motion is not limited, no muscle or joint tenderness  EXTREMITIES: No edema, cyanosis or deformity   Skin turgor improving     MEDICATIONS  (STANDING):  midodrine 5milliGRAM(s) Oral daily  fludroCORTISONE 0.1milliGRAM(s) Oral daily  cefTRIAXone   IVPB  IV Intermittent   cefTRIAXone   IVPB 1Gram(s) IV Intermittent every 24 hours  sodium chloride 0.9%. 1000milliLiter(s) IV Continuous <Continuous>  levETIRAcetam 500milliGRAM(s) Oral two times a day  levothyroxine 75MICROGram(s) Oral daily    MEDICATIONS  (PRN):  oxyCODONE  5 mG/acetaminophen 325 mG 1Tablet(s) Oral every 6 hours PRN Moderate Pain (4 - 6).      LABS:                        10.4   6.3   )-----------( 260      ( 31 Mar 2017 06:12 )             30.8     31 Mar 2017 06:12    139    |  109    |  17.0   ----------------------------<  85     4.0     |  21.0   |  0.60     Ca    8.3        31 Mar 2017 06:12

## 2017-04-01 LAB
ANION GAP SERPL CALC-SCNC: 12 MMOL/L — SIGNIFICANT CHANGE UP (ref 5–17)
APPEARANCE UR: CLEAR — SIGNIFICANT CHANGE UP
BACTERIA # UR AUTO: ABNORMAL
BASOPHILS # BLD AUTO: 0 K/UL — SIGNIFICANT CHANGE UP (ref 0–0.2)
BASOPHILS NFR BLD AUTO: 0.6 % — SIGNIFICANT CHANGE UP (ref 0–2)
BILIRUB UR-MCNC: NEGATIVE — SIGNIFICANT CHANGE UP
BUN SERPL-MCNC: 12 MG/DL — SIGNIFICANT CHANGE UP (ref 8–20)
CALCIUM SERPL-MCNC: 8.4 MG/DL — LOW (ref 8.6–10.2)
CHLORIDE SERPL-SCNC: 107 MMOL/L — SIGNIFICANT CHANGE UP (ref 98–107)
CO2 SERPL-SCNC: 22 MMOL/L — SIGNIFICANT CHANGE UP (ref 22–29)
COLOR SPEC: YELLOW — SIGNIFICANT CHANGE UP
CREAT SERPL-MCNC: 0.52 MG/DL — SIGNIFICANT CHANGE UP (ref 0.5–1.3)
DIFF PNL FLD: ABNORMAL
EPI CELLS # UR: SIGNIFICANT CHANGE UP
GLUCOSE SERPL-MCNC: 77 MG/DL — SIGNIFICANT CHANGE UP (ref 70–115)
GLUCOSE UR QL: NEGATIVE MG/DL — SIGNIFICANT CHANGE UP
HCT VFR BLD CALC: 32 % — LOW (ref 37–47)
HGB BLD-MCNC: 10.4 G/DL — LOW (ref 12–16)
KETONES UR-MCNC: NEGATIVE — SIGNIFICANT CHANGE UP
LEUKOCYTE ESTERASE UR-ACNC: ABNORMAL
LYMPHOCYTES # BLD AUTO: 2 K/UL — SIGNIFICANT CHANGE UP (ref 1–4.8)
LYMPHOCYTES # BLD AUTO: 36.8 % — SIGNIFICANT CHANGE UP (ref 20–55)
MAGNESIUM SERPL-MCNC: 2 MG/DL — SIGNIFICANT CHANGE UP (ref 1.8–2.5)
MCHC RBC-ENTMCNC: 31.7 PG — HIGH (ref 27–31)
MCHC RBC-ENTMCNC: 32.5 G/DL — SIGNIFICANT CHANGE UP (ref 32–36)
MCV RBC AUTO: 97.6 FL — SIGNIFICANT CHANGE UP (ref 81–99)
MONOCYTES # BLD AUTO: 0.6 K/UL — SIGNIFICANT CHANGE UP (ref 0–0.8)
MONOCYTES NFR BLD AUTO: 11.6 % — HIGH (ref 3–10)
NEUTROPHILS # BLD AUTO: 2.7 K/UL — SIGNIFICANT CHANGE UP (ref 1.8–8)
NEUTROPHILS NFR BLD AUTO: 50.6 % — SIGNIFICANT CHANGE UP (ref 37–73)
NITRITE UR-MCNC: NEGATIVE — SIGNIFICANT CHANGE UP
PH UR: 7 — SIGNIFICANT CHANGE UP (ref 4.8–8)
PHOSPHATE SERPL-MCNC: 4.1 MG/DL — SIGNIFICANT CHANGE UP (ref 2.4–4.7)
PLATELET # BLD AUTO: 249 K/UL — SIGNIFICANT CHANGE UP (ref 150–400)
POTASSIUM SERPL-MCNC: 3.6 MMOL/L — SIGNIFICANT CHANGE UP (ref 3.5–5.3)
POTASSIUM SERPL-SCNC: 3.6 MMOL/L — SIGNIFICANT CHANGE UP (ref 3.5–5.3)
PROT UR-MCNC: 15 MG/DL
RBC # BLD: 3.28 M/UL — LOW (ref 4.4–5.2)
RBC # FLD: 17.5 % — HIGH (ref 11–15.6)
RBC CASTS # UR COMP ASSIST: ABNORMAL /HPF (ref 0–4)
SODIUM SERPL-SCNC: 141 MMOL/L — SIGNIFICANT CHANGE UP (ref 135–145)
SP GR SPEC: 1.01 — SIGNIFICANT CHANGE UP (ref 1.01–1.02)
UROBILINOGEN FLD QL: NEGATIVE MG/DL — SIGNIFICANT CHANGE UP
WBC # BLD: 5.4 K/UL — SIGNIFICANT CHANGE UP (ref 4.8–10.8)
WBC # FLD AUTO: 5.4 K/UL — SIGNIFICANT CHANGE UP (ref 4.8–10.8)
WBC UR QL: SIGNIFICANT CHANGE UP

## 2017-04-01 PROCEDURE — 70553 MRI BRAIN STEM W/O & W/DYE: CPT | Mod: 26

## 2017-04-01 PROCEDURE — 99233 SBSQ HOSP IP/OBS HIGH 50: CPT

## 2017-04-01 PROCEDURE — 70544 MR ANGIOGRAPHY HEAD W/O DYE: CPT | Mod: 26,59

## 2017-04-01 RX ADMIN — Medication 75 MICROGRAM(S): at 05:19

## 2017-04-01 RX ADMIN — MIDODRINE HYDROCHLORIDE 5 MILLIGRAM(S): 2.5 TABLET ORAL at 12:24

## 2017-04-01 RX ADMIN — LEVETIRACETAM 500 MILLIGRAM(S): 250 TABLET, FILM COATED ORAL at 05:19

## 2017-04-01 RX ADMIN — SODIUM CHLORIDE 75 MILLILITER(S): 9 INJECTION INTRAMUSCULAR; INTRAVENOUS; SUBCUTANEOUS at 20:11

## 2017-04-01 RX ADMIN — FLUDROCORTISONE ACETATE 0.1 MILLIGRAM(S): 0.1 TABLET ORAL at 05:19

## 2017-04-01 RX ADMIN — LEVETIRACETAM 500 MILLIGRAM(S): 250 TABLET, FILM COATED ORAL at 18:15

## 2017-04-01 RX ADMIN — CEFTRIAXONE 100 GRAM(S): 500 INJECTION, POWDER, FOR SOLUTION INTRAMUSCULAR; INTRAVENOUS at 12:25

## 2017-04-01 NOTE — PROGRESS NOTE ADULT - PROBLEM SELECTOR PLAN 2
Pt complaining of urinary frequency/burning, improving.   -UA positive and Ucx shows >100,000 E. Coli   -c/w rocephin 1g IVPB QD D#4

## 2017-04-01 NOTE — PROGRESS NOTE ADULT - SUBJECTIVE AND OBJECTIVE BOX
Chief Complaint:   patient complained of blood on toilet paper after voiding      Assessment:  Patient A and O x 3, skin warm and dry, rr wnl for rate and rhythm, color pink, H and H stable this am.  Patient states, slight blood on toliet paper, non in urine, none in bowl, and none in stool, but also states, it seems to be increaseing since yesterday when wiping        Plan:  labs in am, D/W Houssain and ua and stool for ob ordered  Will consider GYN in after examining patient.

## 2017-04-01 NOTE — PROGRESS NOTE ADULT - SUBJECTIVE AND OBJECTIVE BOX
Patient is a 57y old  Female who presents with a chief complaint of "Bleeding" (29 Mar 2017 05:06)    INTERVAL HPI/OVERNIGHT EVENTS:  Patient seen and examined at bedside. No acute events overnight. Patient states she is feeling well. No dizziness, no lightheadedness. Patient denies chest pain, SOB, abd pain, N/V, fever, chills, dysuria or any other complaints. All remainder ROS negative.     HEALTH ISSUES - PROBLEM Dx:  UTI (urinary tract infection): UTI (urinary tract infection)  Prophylactic measure: Prophylactic measure  Hypothyroidism: Hypothyroidism  Prolonged QT interval: Prolonged QT interval  Seizure: Seizure  Syncope due to orthostatic hypotension: Syncope due to orthostatic hypotension      Vital Signs Last 24 Hrs  T(C): 37.2, Max: 37.2 (03-31 @ 12:17)  T(F): 99, Max: 99 (03-31 @ 12:17)  HR: 73 (70 - 86)  BP: 126/62 (96/56 - 126/62)  BP(mean): --  RR: 16 (16 - 20)  SpO2: 98% (97% - 98%)    CAPILLARY BLOOD GLUCOSE      I&O's Summary  I & Os for 24h ending 31 Mar 2017 07:00  =============================================  IN: 900 ml / OUT: 0 ml / NET: 900 ml    I & Os for current day (as of 31 Mar 2017 14:52)  =============================================  IN: 450 ml / OUT: 0 ml / NET: 450 ml      CONSTITUTIONAL: Well appearing, Thin, awake, alert and in no apparent distress  EYES: pupils equal, round and reactive to light. EOMI.  CARDIAC: Normal rate, regular rhythm.  Heart sounds S1, S2.  No murmurs, rubs or gallops   RESPIRATORY: Breath sounds clear and equal bilaterally. No wheezes, rales or rhonchi  ABDOMEN: Soft, NT ND BS+  MUSCULOSKELETAL: Spine appears normal, range of motion is not limited, no muscle or joint tenderness  EXTREMITIES: No edema, cyanosis or deformity     MEDICATIONS  (STANDING):  midodrine 5milliGRAM(s) Oral daily  fludroCORTISONE 0.1milliGRAM(s) Oral daily  cefTRIAXone   IVPB  IV Intermittent   cefTRIAXone   IVPB 1Gram(s) IV Intermittent every 24 hours  sodium chloride 0.9%. 1000milliLiter(s) IV Continuous <Continuous>  levETIRAcetam 500milliGRAM(s) Oral two times a day  levothyroxine 75MICROGram(s) Oral daily    MEDICATIONS  (PRN):  oxyCODONE  5 mG/acetaminophen 325 mG 1Tablet(s) Oral every 6 hours PRN Moderate Pain (4 - 6).      LABS:                        10.4   6.3   )-----------( 260      ( 31 Mar 2017 06:12 )             30.8     31 Mar 2017 06:12    139    |  109    |  17.0   ----------------------------<  85     4.0     |  21.0   |  0.60     Ca    8.3        31 Mar 2017 06:12

## 2017-04-01 NOTE — PROGRESS NOTE ADULT - PROBLEM SELECTOR PLAN 1
patient has extensive history of dysautonomia and known orthostatic hypotension with multiple prior hospitalizations, orthostasis improved and found to have epileptogenic activity on EEG.  -Orthostasis daily  -avoid all medications which can worsen orthostasis    -c/w IVF     -c/w midodrine 5mg po qd  -c/w florinef 0.1mg po qd   -pt has bought her northera from home c/w 100mg po bid   -cardio f/u noted and appreciated will d/c telemetry no further cardiac work up inpatient    -CT head negative for any findings   -carotid duplex  negative for any findings   -EEG as above shows paroxysmal sharp wave of generalized slowing potentially   epileptogenic. Neurology has started keppra 500mg po bid   - MRI/MRA head  to be completed today   -f/u cardiology in 1 month as an outpt.

## 2017-04-02 LAB
HCT VFR BLD CALC: 29.7 % — LOW (ref 37–47)
HGB BLD-MCNC: 10 G/DL — LOW (ref 12–16)
MCHC RBC-ENTMCNC: 32.7 PG — HIGH (ref 27–31)
MCHC RBC-ENTMCNC: 33.7 G/DL — SIGNIFICANT CHANGE UP (ref 32–36)
MCV RBC AUTO: 97.1 FL — SIGNIFICANT CHANGE UP (ref 81–99)
PLATELET # BLD AUTO: 264 K/UL — SIGNIFICANT CHANGE UP (ref 150–400)
RBC # BLD: 3.06 M/UL — LOW (ref 4.4–5.2)
RBC # FLD: 17.7 % — HIGH (ref 11–15.6)
WBC # BLD: 5.8 K/UL — SIGNIFICANT CHANGE UP (ref 4.8–10.8)
WBC # FLD AUTO: 5.8 K/UL — SIGNIFICANT CHANGE UP (ref 4.8–10.8)

## 2017-04-02 PROCEDURE — 99233 SBSQ HOSP IP/OBS HIGH 50: CPT

## 2017-04-02 PROCEDURE — 74176 CT ABD & PELVIS W/O CONTRAST: CPT | Mod: 26

## 2017-04-02 RX ORDER — HYDROMORPHONE HYDROCHLORIDE 2 MG/ML
1 INJECTION INTRAMUSCULAR; INTRAVENOUS; SUBCUTANEOUS EVERY 6 HOURS
Qty: 0 | Refills: 0 | Status: DISCONTINUED | OUTPATIENT
Start: 2017-04-02 | End: 2017-04-05

## 2017-04-02 RX ORDER — CEFTRIAXONE 500 MG/1
1000 INJECTION, POWDER, FOR SOLUTION INTRAMUSCULAR; INTRAVENOUS EVERY 24 HOURS
Qty: 0 | Refills: 0 | Status: DISCONTINUED | OUTPATIENT
Start: 2017-04-03 | End: 2017-04-03

## 2017-04-02 RX ORDER — SODIUM CHLORIDE 9 MG/ML
1000 INJECTION INTRAMUSCULAR; INTRAVENOUS; SUBCUTANEOUS
Qty: 0 | Refills: 0 | Status: DISCONTINUED | OUTPATIENT
Start: 2017-04-02 | End: 2017-04-02

## 2017-04-02 RX ADMIN — MIDODRINE HYDROCHLORIDE 5 MILLIGRAM(S): 2.5 TABLET ORAL at 11:01

## 2017-04-02 RX ADMIN — FLUDROCORTISONE ACETATE 0.1 MILLIGRAM(S): 0.1 TABLET ORAL at 05:34

## 2017-04-02 RX ADMIN — CEFTRIAXONE 100 GRAM(S): 500 INJECTION, POWDER, FOR SOLUTION INTRAMUSCULAR; INTRAVENOUS at 11:12

## 2017-04-02 RX ADMIN — Medication 75 MICROGRAM(S): at 05:34

## 2017-04-02 RX ADMIN — LEVETIRACETAM 500 MILLIGRAM(S): 250 TABLET, FILM COATED ORAL at 05:34

## 2017-04-02 RX ADMIN — LEVETIRACETAM 500 MILLIGRAM(S): 250 TABLET, FILM COATED ORAL at 18:24

## 2017-04-02 NOTE — CONSULT NOTE ADULT - SUBJECTIVE AND OBJECTIVE BOX
Patient is a 57y old  Female who presents with a chief complaint of "Bleeding" (29 Mar 2017 05:06)      HPI:  56 F with PMH sjogrens syndrome with peripheral neuropathy, chronic inflammatory demyelinating polyneuritis, restless leg syndrome, dysautonomia, GI dysmotility, orthostatic hypotension (on midodrine), patient has prior multiple hospitalizations for unresponsiveness/ syncope as well as prior concern for self medication with narcotics/ benzos. Patient follows with neurologist Dr. Brewer and is on treatment with IVIG and rituxan. Patient was in the neurologist office today and had a spell of decreased responsiveness. Patient recalls receiving the IVIG and then waking up in the hospital. Denies urinary/bowel incontinence. As per neuro normal brain MRI 2016. Patient denies chest pain, SOB, abd pain, N/V, fever, chills, dysuria or any other complaints. All remainder ROS negative. In ER found to have prolonged QTC:610 and was given magnesium 2grams. Patient remained asymptomatic. (28 Mar 2017 18:19)     CONSULT REQUESTED - HER UROLOGIST, DR. KOTHARI IS NOT ON STAFF.    Pt developed sudden sharp LT flank pain 4 days ago. It doesn't radiate & is at a level of 8/10. It has been intermittent & associated with nausea but no vomiting. She has had dysuria, frequency & urgency & is being treated for an E.coli UTI.    Past  Hx :  nephrolithiasis x 10 yrs > LT & always passed.     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 No nausea, vomiting or hematemesis; No diarrhea or constipation. No melena or hematochezia.  Genitourinary: No dysuria, frequency, hematuria or incontinence  Rectal: No pain, hemorrhoids or incontinence  Neurological: No headaches, memory loss, loss of strength, numbness or tremors  Skin: No itching, burning, rashes or lesions   Lymph Nodes: No enlarged glands  Musculoskeletal: No joint pain or swelling; No muscle, back or extremity pain  Psychiatric: No depression, anxiety, mood swings or difficulty sleeping  Heme/Lymph: No easy bruising or bleeding gums  Allergy and Immunologic: No hives or eczema    MEDICATIONS  (STANDING):  midodrine 5milliGRAM(s) Oral daily  fludroCORTISONE 0.1milliGRAM(s) Oral daily  levETIRAcetam 500milliGRAM(s) Oral two times a day  levothyroxine 75MICROGram(s) Oral daily    MEDICATIONS  (PRN):  HYDROmorphone  Injectable 1milliGRAM(s) IV Push every 6 hours PRN Severe Pain (7 - 10)  oxyCODONE  5 mG/acetaminophen 325 mG 2Tablet(s) Oral every 6 hours PRN Moderate Pain (4 - 6)      Allergies    contrast media (iodine-based) (Rash)  ertapenem (Other)    Intolerances        SOCIAL HISTORY:    FAMILY HISTORY:  No pertinent family history in first degree relatives  No pertinent family history in first degree relatives      Vital Signs Last 24 Hrs  T(C): 36.7, Max: 37 (04- @ 20:05)  T(F): 98, Max: 98.6 (- @ 20:05)  HR: 71 (64 - 90)  BP: 101/60 (100/68 - 125/76)  BP(mean): 3 (3 - 3)  RR: 18 (12 - 18)  SpO2: 98% (98% - 98%)    PHYSICAL EXAM:    General: Well developed; well nourished; in no acute distress  Head: Normocephalic; atraumatic  Respiratory: No wheezes, rales or rhonchi  Cardiovascular: Regular rate and rhythm. S1 and S2 Normal; No murmurs, gallops or rubs  Gastrointestinal: Soft non-tender non-distended; Normal bowel sounds; No hepatosplenomegaly  Genitourinary: LT costovertebral angle tenderness.  Urinary bladder is clinically not distended  Extremities: Normal range of motion, No clubbing, cyanosis or edema  Vascular: Peripheral pulses palpable 2+ bilaterally  Neurological: Alert and oriented x4  Skin: Warm and dry. No acute rash  Musculoskeletal: Normal gait, tone, without deformities  Psychiatric: Cooperative and appropriate      LABS:                        10.0   5.8   )-----------( 264      ( 2017 08:59 )             29.7     2017 07:32    141    |  107    |  12.0   ----------------------------<  77     3.6     |  22.0   |  0.52     Ca    8.4        2017 07:32  Phos  4.1       2017 07:32  Mg     2.0       2017 07:32        Urinalysis Basic - ( 2017 20:34 )    Color: Yellow / Appearance: Clear / S.010 / pH: x  Gluc: x / Ketone: Negative  / Bili: Negative / Urobili: Negative mg/dL   Blood: x / Protein: 15 mg/dL / Nitrite: Negative   Leuk Esterase: Trace / RBC: 11-25 /HPF / WBC 3-5   Sq Epi: x / Non Sq Epi: Occasional / Bacteria: Occasional          RADIOLOGY & ADDITIONAL STUDIES:     EXAM:  CT RENAL STONE HUNT                          PROCEDURE DATE:  2017        INTERPRETATION:  EXAMINATION DATE: 2017 at 1518 hours.  CLINICAL INFORMATION: Hematuria. History of nephrolithiasis.    COMPARISON: 2017.    PROCEDURE:   CT of the Abdomen and Pelvis was performed without intravenous contrast   in the prone position.  Intravenous contrast: None.  Oral contrast: None.  Sagittal and coronal reformats were performed.    FINDINGS:    LOWER CHEST: Within normallimits.    LIVER: Within normal limits.  BILE DUCTS: Mild intrahepatic and common bile duct dilatation, possibly   reflecting postcholecystectomy state.   GALLBLADDER: Status post cholecystectomy.  SPLEEN: Within normal limits.  PANCREAS: Within normal limits.  ADRENALS: Extensive streak artifact from surgical clips in the region of   the left adrenal gland. Right adrenal gland is within normal limits.  KIDNEYS/URETERS: Left upper pole is obscured from streak artifact from   surgical clips in theregion of the left adrenal gland. Nonobstructing   calculus in the left lower pole, measuring 0.4 cm. No hydronephrosis of   either kidney.    BLADDER: Within normal limits.  REPRODUCTIVE ORGANS: Status post hysterectomy.    BOWEL: No bowel obstruction.     PERITONEUM: No ascites.  VESSELS:  Atherosclerotic change of the abdominal aorta and its branches.  RETROPERITONEUM: No lymphadenopathy.    ABDOMINAL WALL: Within normal limits.  BONES: Within normal limits.    IMPRESSION: Nonobstructing left lower pole renal calculus, measuring 0.4   cm. No hydronephrosis.                        DEANGELO HUNT M.D., ATTENDING RADIOLOGIST  This document has been electronically signed. 2017  3:43PM

## 2017-04-02 NOTE — PROGRESS NOTE ADULT - PROBLEM SELECTOR PLAN 2
Pt complaining of urinary frequency/burning, with hematuria   suspicious for nephrolithiais. ct with renal study ordered.   urology Dr Beltre consulted  -UA positive and Ucx shows >100,000 E. Coli   -c/w rocephin 1g IVPB QD D#5  pain control with iv dilaudid Pt complaining of urinary frequency/burning, with hematuria   suspicious for nephrolithiais. ct with renal study ordered.   urology Dr Beltre consulted  -UA positive and Ucx shows >100,000 E. Coli   -c/w rocephin 1g  D#5 changed to IM as pt has no iv access  pain control with iv dilaudid  encourage po fluid intake as no iv access. for mid line tomorrow

## 2017-04-02 NOTE — PROGRESS NOTE ADULT - PROBLEM SELECTOR PLAN 1
patient has extensive history of dysautonomia and known orthostatic hypotension with multiple prior hospitalizations, orthostasis improved and found to have epileptogenic activity on EEG.  -Orthostasis daily  -avoid all medications which can worsen orthostasis    -c/w IVF     -c/w midodrine 5mg po qd  -c/w florinef 0.1mg po qd   -pt has bought her northera from home c/w 100mg po bid   -cardio f/u noted and appreciated will d/c telemetry no further cardiac work up inpatient    -CT head negative for any findings   -carotid duplex  negative for any findings   -EEG as above shows paroxysmal sharp wave of generalized slowing potentially   epileptogenic. Neurology has started keppra 500mg po bid.   - MRI/MRA head  wnl  -f/u cardiology in 1 month as an outpt.

## 2017-04-02 NOTE — CONSULT NOTE ADULT - ASSESSMENT
1) 4 mm calculus lower pole LT kidney on today's CT  2) LT renal colic    Recommend :  1) IV hydration 125 ml/hr  2) strain urine  3) parenteral narcotic for severe pain  4) oral narcotic for mod. pain  5) IV zofran for N/V  6) continue AB  7) F/U with Dr. Mendez      Thank You.

## 2017-04-02 NOTE — PROGRESS NOTE ADULT - SUBJECTIVE AND OBJECTIVE BOX
[id number]    Patient is a 57y old  Female who presents with a chief complaint of "Bleeding" (29 Mar 2017 05:06)    Today pt c/o 8/10 pain in left flank region, pain is constant present for 6 days. Pt states she did not tell anyone about the pain as she thought is was from the urine infection. Pt states that she has a hx of kidney stones, last one 17 months ago which she passed herself. Notes that she follows with her urologist Dr Mendez. Notes that she has blood tinged urine when she wipes.     ROS:  +left flak pain, +hematuria, +dysuria. no fevers, no chills    HPI:  56 F with PMH sjogrens syndrome with peripheral neuropathy, chronic inflammatory demyelinating polyneuritis, restless leg syndrome, dysautonomia, GI dysmotility, orthostatic hypotension (on midodrine), patient has prior multiple hospitalizations for unresponsiveness/ syncope as well as prior concern for self medication with narcotics/ benzos. Patient follows with neurologist Dr. Brewer and is on treatment with IVIG and rituxan. Patient was in the neurologist office today and had a spell of decreased responsiveness. Patient recalls receiving the IVIG and then waking up in the hospital. Denies urinary/bowel incontinence. As per neuro normal brain MRI 2016. In ER found to have prolonged QTC:610 and was given magnesium 2grams. Patient remained asymptomatic. 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 1000milliLiter(s) IV Continuous <Continuous>    MEDICATIONS  (PRN):  oxyCODONE  5 mG/acetaminophen 325 mG 1Tablet(s) Oral every 6 hours PRN Moderate Pain (4 - 6)    LABS:  Urinalysis Basic - ( 2017 20:34 )    Color: Yellow / Appearance: Clear / S.010 / pH: x  Gluc: x / Ketone: Negative  / Bili: Negative / Urobili: Negative mg/dL   Blood: x / Protein: 15 mg/dL / Nitrite: Negative   Leuk Esterase: Trace / RBC: 11-25 /HPF / WBC 3-5   Sq Epi: x / Non Sq Epi: Occasional / Bacteria: Occasional                   10.0   5.8   )-----------( 264      ( 2017 08:59 )             29.7     2017 07:32    141    |  107    |  12.0   ----------------------------<  77     3.6     |  22.0   |  0.52     Ca    8.4        2017 07:32  Phos  4.1       2017 07:32  Mg     2.0       2017 07:32    PHYSICAL EXAM:  Vital Signs Last 24 Hrs  T(C): 36.7, Max: 37 ( @ 20:05)  T(F): 98, Max: 98.6 ( @ 20:05)  HR: 71 (64 - 90)  BP: 101/60 (100/68 - 125/76)  BP(mean): 3 (3 - 3)  RR: 18 (12 - 18)  SpO2: 98% (98% - 98%)    GENERAL NAD, ALERT SITTING IN CHAIR  HEENT NORMAL CEPHALIC ATRAUMATIC, EOMI, PERRLA  CVS: S1,S2,RRR, NO MURMUR, NO RUBS, NO GALLOPS  RESP: BL CTA, NO WHEEZING, NO RHONCHI, NO CRACKLES, NO LABORED BREATHING  ABD: SOFT, NON TENDER, NON DISTENDED, +BOWEL SOUNDS IN ALL 4 QUADRANTS. +LEFT SIDED FLANK PAIN  EXT: NO EDEMA  SKIN: WARM, DRY, INTACT  NEURO AOX3  MUSCULOSKELETAL: ROM INTACT IN ALL 4 EXTREMITIES  RECTAL DEFFERED  PSYCH APPROPRIATE

## 2017-04-03 LAB
ANION GAP SERPL CALC-SCNC: 10 MMOL/L — SIGNIFICANT CHANGE UP (ref 5–17)
BUN SERPL-MCNC: 15 MG/DL — SIGNIFICANT CHANGE UP (ref 8–20)
CALCIUM SERPL-MCNC: 8.6 MG/DL — SIGNIFICANT CHANGE UP (ref 8.6–10.2)
CHLORIDE SERPL-SCNC: 105 MMOL/L — SIGNIFICANT CHANGE UP (ref 98–107)
CO2 SERPL-SCNC: 25 MMOL/L — SIGNIFICANT CHANGE UP (ref 22–29)
CREAT SERPL-MCNC: 0.51 MG/DL — SIGNIFICANT CHANGE UP (ref 0.5–1.3)
GLUCOSE SERPL-MCNC: 84 MG/DL — SIGNIFICANT CHANGE UP (ref 70–115)
HCT VFR BLD CALC: 29.2 % — LOW (ref 37–47)
HGB BLD-MCNC: 9.7 G/DL — LOW (ref 12–16)
MCHC RBC-ENTMCNC: 33.1 PG — HIGH (ref 27–31)
MCHC RBC-ENTMCNC: 33.2 G/DL — SIGNIFICANT CHANGE UP (ref 32–36)
MCV RBC AUTO: 99.7 FL — HIGH (ref 81–99)
PLATELET # BLD AUTO: 273 K/UL — SIGNIFICANT CHANGE UP (ref 150–400)
POTASSIUM SERPL-MCNC: 4.3 MMOL/L — SIGNIFICANT CHANGE UP (ref 3.5–5.3)
POTASSIUM SERPL-SCNC: 4.3 MMOL/L — SIGNIFICANT CHANGE UP (ref 3.5–5.3)
RBC # BLD: 2.93 M/UL — LOW (ref 4.4–5.2)
RBC # FLD: 17.5 % — HIGH (ref 11–15.6)
SODIUM SERPL-SCNC: 140 MMOL/L — SIGNIFICANT CHANGE UP (ref 135–145)
WBC # BLD: 5.9 K/UL — SIGNIFICANT CHANGE UP (ref 4.8–10.8)
WBC # FLD AUTO: 5.9 K/UL — SIGNIFICANT CHANGE UP (ref 4.8–10.8)

## 2017-04-03 PROCEDURE — 99233 SBSQ HOSP IP/OBS HIGH 50: CPT

## 2017-04-03 RX ORDER — CEFTRIAXONE 500 MG/1
1 INJECTION, POWDER, FOR SOLUTION INTRAMUSCULAR; INTRAVENOUS EVERY 24 HOURS
Qty: 0 | Refills: 0 | Status: DISCONTINUED | OUTPATIENT
Start: 2017-04-03 | End: 2017-04-05

## 2017-04-03 RX ORDER — CEFTRIAXONE 500 MG/1
2 INJECTION, POWDER, FOR SOLUTION INTRAMUSCULAR; INTRAVENOUS EVERY 24 HOURS
Qty: 0 | Refills: 0 | Status: DISCONTINUED | OUTPATIENT
Start: 2017-04-03 | End: 2017-04-03

## 2017-04-03 RX ORDER — SODIUM CHLORIDE 9 MG/ML
1000 INJECTION INTRAMUSCULAR; INTRAVENOUS; SUBCUTANEOUS
Qty: 0 | Refills: 0 | Status: DISCONTINUED | OUTPATIENT
Start: 2017-04-03 | End: 2017-04-05

## 2017-04-03 RX ADMIN — SODIUM CHLORIDE 125 MILLILITER(S): 9 INJECTION INTRAMUSCULAR; INTRAVENOUS; SUBCUTANEOUS at 17:16

## 2017-04-03 RX ADMIN — Medication 75 MICROGRAM(S): at 05:29

## 2017-04-03 RX ADMIN — CEFTRIAXONE 1000 MILLIGRAM(S): 500 INJECTION, POWDER, FOR SOLUTION INTRAMUSCULAR; INTRAVENOUS at 10:38

## 2017-04-03 RX ADMIN — FLUDROCORTISONE ACETATE 0.1 MILLIGRAM(S): 0.1 TABLET ORAL at 05:29

## 2017-04-03 RX ADMIN — MIDODRINE HYDROCHLORIDE 5 MILLIGRAM(S): 2.5 TABLET ORAL at 05:34

## 2017-04-03 RX ADMIN — LEVETIRACETAM 500 MILLIGRAM(S): 250 TABLET, FILM COATED ORAL at 05:29

## 2017-04-03 RX ADMIN — LEVETIRACETAM 500 MILLIGRAM(S): 250 TABLET, FILM COATED ORAL at 17:14

## 2017-04-03 NOTE — PROCEDURE NOTE - NSINFORMCONSENT_GEN_A_CORE
Benefits, risks, and possible complications of procedure explained to patient/caregiver who verbalized understanding and gave verbal consent.
This was an emergent procedure.

## 2017-04-03 NOTE — CHART NOTE - NSCHARTNOTEFT_GEN_A_CORE
A referral for inpatient midline insertion by ultrasound guidance was received in the medicine department from a PMD/PCP order and an order is present in chart. Patient was registered and identification wrist tag present.  After discussing the risks, benefits and alternatives of this procedure with the patient, informed.  Patient was then draped and prepped for procedure utilizing sterile technique.  Ultrasound revealed patent left brachial vein and image was saved. Preliminary measurements were made by the distance from the venotomy site to the distal tip. Ultrasound guidance for correct side and insertion site marked for procedure.  Laterality measures were performed by the medical team and a time out for correct patient,  correct side , and insertion, and marked with healthcare provider initials. Sterile field created, chlorhexidine gluconate scrub was performed. 18 gauge micropuncture introducer needle was inserted by ultrasound guidance to the left brachial vein and confirmed with heme return. A 0.018 guidewire inserted to secure access and the needle was removed.  A 18G catheter was then inserted over the wire.  A 18G midline inserted to the appropriate length ( 10cm ) and then advanced into place through ultrasound observation. The guidewire was then removed.  By ultrasound guidance the catheter was purported to be a good position.  The site was then dressed with an occlusive dressing with lock anchor and no heme drainage was noted.   There were no immediate complications.  Patient was given discharge instructions and paperwork for care and maintenance. The patient was then given instruction to go to PCP/PMD or local emergency room if swelling, redness, tenderness, fever or drainage was to occur.  Patient tolerated procedure well.

## 2017-04-03 NOTE — PROCEDURE NOTE - PROCEDURE
Midline catheter insertion  04/03/2017  18G  10CM BARD POWER MIDLINE ultrasound guidance good heme return to left brachial vein  Active  JSTEELE

## 2017-04-03 NOTE — DIETITIAN INITIAL EVALUATION ADULT. - OTHER INFO
Pt admitted with kidney calculus. Tolerating regular diet with fair-good intake. Menu options reviewed with pt, pt happy with choices.

## 2017-04-03 NOTE — PROGRESS NOTE ADULT - PROBLEM SELECTOR PLAN 1
patient has extensive history of dysautonomia and known orthostatic hypotension with multiple prior hospitalizations, orthostasis improved and found to have epileptogenic activity on EEG.  -Orthostasis daily    -avoid all medications which can worsen orthostasis    -c/w midodrine 5mg po qd   -c/w florinef 0.1mg po qd   - pt has bought her northera from home c/w 100mg po bid   -cardio f/u noted and appreciated will d/c telemetry no further cardiac work up inpatient    -CT head negative for any findings   -carotid duplex  negative for any findings   -EEG as above shows paroxysmal sharp wave of generalized slowing potentially   epileptogenic. Neurology has started keppra 500mg po bid.   - MRI/MRA head  wnl  -f/u cardiology in 1 month as an outpt.

## 2017-04-03 NOTE — PROCEDURE NOTE - NSPROCDETAILS_GEN_ALL_CORE
dressing applied/secured in place/sterile technique, catheter placed/ultrasound utilization/blood seen on insertion/location identified, draped/prepped, sterile technique used/flushes easily
ultrasound guidance/sterile dressing applied/location identified, draped/prepped, sterile technique used/sterile technique, catheter placed/supine position/ultrasound assessment

## 2017-04-03 NOTE — PROCEDURE NOTE - NSSITEPREP_SKIN_A_CORE
chlorhexidine
Adherence to aseptic technique: hand hygiene prior to donning barriers (gown, gloves), don cap and mask, sterile drape over patient/chlorhexidine

## 2017-04-03 NOTE — PROCEDURE NOTE - NSPOSTPRCRAD_GEN_A_CORE
postion of catheter/ultrasound/depth of insertion/line adjusted to depth of insertion/line in appropriate postion

## 2017-04-03 NOTE — PROGRESS NOTE ADULT - PROBLEM SELECTOR PLAN 2
Pt complaining of urinary frequency/burning, with hematuria   suspicious for nephrolithiais. ct with renal study with left renal caliculus.  urology Dr Beltre consulted, iv access obtained via mid line. will restart ivfs and iv rocephin. starin urine  -UA positive and Ucx shows >100,000 E. Coli   -c/w rocephin 1g  D#5 changed to IM as pt has no iv access  pain control with iv dilaudid

## 2017-04-03 NOTE — PROCEDURE NOTE - NSPOSTCAREGUIDE_GEN_A_CORE
Verbal/written post procedure instructions were given to patient/caregiver
Verbal/written post procedure instructions were given to patient/caregiver/Instructed patient/caregiver regarding signs and symptoms of infection/Instructed patient/caregiver to follow-up with primary care physician/Keep the cast/splint/dressing clean and dry/Care for catheter as per unit/ICU protocols

## 2017-04-03 NOTE — PROGRESS NOTE ADULT - SUBJECTIVE AND OBJECTIVE BOX
CARLOS EDUARDO MIRELESASIF-[id number]    Patient is a 57y old  Female who presents with a chief complaint of "Bleeding" (29 Mar 2017 05:06)    HPI:  56 F with PMH sjogrens syndrome with peripheral neuropathy, chronic inflammatory demyelinating polyneuritis, restless leg syndrome, dysautonomia, GI dysmotility, orthostatic hypotension (on midodrine), patient has prior multiple hospitalizations for unresponsiveness/ syncope as well as prior concern for self medication with narcotics/ benzos. Patient follows with neurologist Dr. Brewer and is on treatment with IVIG and rituxan. Patient was in the neurologist office today and had a spell of decreased responsiveness. Patient recalls receiving the IVIG and then waking up in the hospital. Denies urinary/bowel incontinence. As per neuro normal brain MRI 2016. Patient denies chest pain, SOB, abd pain, N/V, fever, chills, dysuria or any other complaints. All remainder ROS negative. In ER found to have prolonged QTC:610 and was given magnesium 2grams. Patient remained asymptomatic. (28 Mar 2017 18:19)    Today pt notes that she is still having pain in the left flank region. Pain is sharp, 5 to 8/10. Intermittent. Pain present since admission. states she is drinking alot of water.   Pt did lose IV access yesterday for ivfs and iv antibiotics. unable to place peripheral line by multiple nurses and myself.     ROS:   no fevers   no chills  no vomiting  no abd pain    HEALTH ISSUES - PROBLEM Dx:  UTI (urinary tract infection): UTI (urinary tract infection)  Prophylactic measure: Prophylactic measure  Hypothyroidism: Hypothyroidism  Prolonged QT interval: Prolonged QT interval  Seizure: Seizure  Syncope due to orthostatic hypotension: Syncope due to orthostatic hypotension    PAST MEDICAL & SURGICAL HISTORY:  Hypothyroidism  PVD (peripheral vascular disease)  Sjogren-Addy syndrome: herniated disc, thorasic  Cushings syndrome  Lupus  POTS (postural orthostatic tachycardia syndrome)  S/P hysterectomy: 2015  H/O foot surgery: left ankle   S/P tubal ligation: and ablation    infusive port   S/P  section:   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 1000milliLiter(s) IV Continuous <Continuous>  cefTRIAXone   IVPB 1Gram(s) IV Intermittent every 24 hours    MEDICATIONS  (PRN):  HYDROmorphone  Injectable 1milliGRAM(s) IV Push every 6 hours PRN Severe Pain (7 - 10)  oxyCODONE  5 mG/acetaminophen 325 mG 2Tablet(s) Oral every 6 hours PRN Moderate Pain (4 - 6)      LABS:  Urinalysis Basic - ( 2017 20:34 )    Color: Yellow / Appearance: Clear / S.010 / pH: x  Gluc: x / Ketone: Negative  / Bili: Negative / Urobili: Negative mg/dL   Blood: x / Protein: 15 mg/dL / Nitrite: Negative   Leuk Esterase: Trace / RBC: 11-25 /HPF / WBC 3-5   Sq Epi: x / Non Sq Epi: Occasional / Bacteria: Occasional                9.7    5.9   )-----------( 273      ( 2017 07:25 )             29.2     2017 07:25    140    |  105    |  15.0   ----------------------------<  84     4.3     |  25.0   |  0.51     Ca    8.6        2017 07:25    PHYSICAL EXAM:  Vital Signs Last 24 Hrs  T(C): 37, Max: 37.1 ( @ 20:39)  T(F): 98.6, Max: 98.7 ( @ 20:39)  HR: 74 (74 - 79)  BP: 94/58 (82/52 - 113/61)  BP(mean): --  RR: 16 (16 - 18)  SpO2: 97% (97% - 98%)    GENERAL NAD, ALERT SITTING IN CHAIR  HEENT NORMAL CEPHALIC ATRAUMATIC, EOMI, PERRLA  CVS: S1,S2,RRR, NO MURMUR, NO RUBS, NO GALLOPS  RESP: BL CTA, NO WHEEZING, NO RHONCHI, NO CRACKLES, NO LABORED BREATHING  EXT: NO EDEMA  SKIN: WARM, DRY, INTACT  NEURO AOX3  MUSCULOSKELETAL: ROM INTACT IN ALL 4 EXTREMITIES  PSYCH APPROPRIATE

## 2017-04-04 LAB
ANION GAP SERPL CALC-SCNC: 9 MMOL/L — SIGNIFICANT CHANGE UP (ref 5–17)
APPEARANCE UR: CLEAR — SIGNIFICANT CHANGE UP
BILIRUB UR-MCNC: NEGATIVE — SIGNIFICANT CHANGE UP
BUN SERPL-MCNC: 11 MG/DL — SIGNIFICANT CHANGE UP (ref 8–20)
CALCIUM SERPL-MCNC: 8.3 MG/DL — LOW (ref 8.6–10.2)
CHLORIDE SERPL-SCNC: 108 MMOL/L — HIGH (ref 98–107)
CO2 SERPL-SCNC: 23 MMOL/L — SIGNIFICANT CHANGE UP (ref 22–29)
COLOR SPEC: SIGNIFICANT CHANGE UP
CREAT SERPL-MCNC: 0.45 MG/DL — LOW (ref 0.5–1.3)
DIFF PNL FLD: NEGATIVE — SIGNIFICANT CHANGE UP
EPI CELLS # UR: SIGNIFICANT CHANGE UP
GLUCOSE SERPL-MCNC: 72 MG/DL — SIGNIFICANT CHANGE UP (ref 70–115)
GLUCOSE UR QL: NEGATIVE MG/DL — SIGNIFICANT CHANGE UP
HCT VFR BLD CALC: 27.1 % — LOW (ref 37–47)
HGB BLD-MCNC: 8.8 G/DL — LOW (ref 12–16)
KETONES UR-MCNC: NEGATIVE — SIGNIFICANT CHANGE UP
LEUKOCYTE ESTERASE UR-ACNC: ABNORMAL
MCHC RBC-ENTMCNC: 32.5 G/DL — SIGNIFICANT CHANGE UP (ref 32–36)
MCHC RBC-ENTMCNC: 32.8 PG — HIGH (ref 27–31)
MCV RBC AUTO: 101.1 FL — HIGH (ref 81–99)
NITRITE UR-MCNC: NEGATIVE — SIGNIFICANT CHANGE UP
PH UR: 7 — SIGNIFICANT CHANGE UP (ref 4.8–8)
PLATELET # BLD AUTO: 258 K/UL — SIGNIFICANT CHANGE UP (ref 150–400)
POTASSIUM SERPL-MCNC: 4.4 MMOL/L — SIGNIFICANT CHANGE UP (ref 3.5–5.3)
POTASSIUM SERPL-SCNC: 4.4 MMOL/L — SIGNIFICANT CHANGE UP (ref 3.5–5.3)
PROT UR-MCNC: NEGATIVE MG/DL — SIGNIFICANT CHANGE UP
RBC # BLD: 2.68 M/UL — LOW (ref 4.4–5.2)
RBC # FLD: 17.7 % — HIGH (ref 11–15.6)
RBC CASTS # UR COMP ASSIST: SIGNIFICANT CHANGE UP /HPF (ref 0–4)
SODIUM SERPL-SCNC: 140 MMOL/L — SIGNIFICANT CHANGE UP (ref 135–145)
SP GR SPEC: 1 — LOW (ref 1.01–1.02)
UROBILINOGEN FLD QL: NEGATIVE MG/DL — SIGNIFICANT CHANGE UP
WBC # BLD: 5.1 K/UL — SIGNIFICANT CHANGE UP (ref 4.8–10.8)
WBC # FLD AUTO: 5.1 K/UL — SIGNIFICANT CHANGE UP (ref 4.8–10.8)
WBC UR QL: SIGNIFICANT CHANGE UP

## 2017-04-04 PROCEDURE — 99233 SBSQ HOSP IP/OBS HIGH 50: CPT

## 2017-04-04 RX ORDER — ONDANSETRON 8 MG/1
4 TABLET, FILM COATED ORAL ONCE
Qty: 0 | Refills: 0 | Status: COMPLETED | OUTPATIENT
Start: 2017-04-04 | End: 2017-04-04

## 2017-04-04 RX ORDER — METOCLOPRAMIDE HCL 10 MG
10 TABLET ORAL EVERY 8 HOURS
Qty: 0 | Refills: 0 | Status: DISCONTINUED | OUTPATIENT
Start: 2017-04-04 | End: 2017-04-05

## 2017-04-04 RX ADMIN — SODIUM CHLORIDE 125 MILLILITER(S): 9 INJECTION INTRAMUSCULAR; INTRAVENOUS; SUBCUTANEOUS at 15:39

## 2017-04-04 RX ADMIN — Medication 75 MICROGRAM(S): at 05:20

## 2017-04-04 RX ADMIN — ONDANSETRON 4 MILLIGRAM(S): 8 TABLET, FILM COATED ORAL at 02:18

## 2017-04-04 RX ADMIN — CEFTRIAXONE 100 GRAM(S): 500 INJECTION, POWDER, FOR SOLUTION INTRAMUSCULAR; INTRAVENOUS at 17:15

## 2017-04-04 RX ADMIN — MIDODRINE HYDROCHLORIDE 5 MILLIGRAM(S): 2.5 TABLET ORAL at 17:15

## 2017-04-04 RX ADMIN — LEVETIRACETAM 500 MILLIGRAM(S): 250 TABLET, FILM COATED ORAL at 17:15

## 2017-04-04 RX ADMIN — LEVETIRACETAM 500 MILLIGRAM(S): 250 TABLET, FILM COATED ORAL at 05:20

## 2017-04-04 RX ADMIN — SODIUM CHLORIDE 125 MILLILITER(S): 9 INJECTION INTRAMUSCULAR; INTRAVENOUS; SUBCUTANEOUS at 01:31

## 2017-04-04 RX ADMIN — FLUDROCORTISONE ACETATE 0.1 MILLIGRAM(S): 0.1 TABLET ORAL at 05:20

## 2017-04-04 NOTE — PROGRESS NOTE ADULT - SUBJECTIVE AND OBJECTIVE BOX
CARLOS EDUARDO MIRELESASIF-[id number]    Patient is a 57y old  Female who presents with a chief complaint of "Bleeding" (29 Mar 2017 05:06)    HPI:  56 F with PMH sjogrens syndrome with peripheral neuropathy, chronic inflammatory demyelinating polyneuritis, restless leg syndrome, dysautonomia, GI dysmotility, orthostatic hypotension (on midodrine), patient has prior multiple hospitalizations for unresponsiveness/ syncope as well as prior concern for self medication with narcotics/ benzos. Patient follows with neurologist Dr. Brewer and is on treatment with IVIG and rituxan. Patient was in the neurologist office and had a spell of decreased responsiveness. Patient recalls receiving the IVIG and then waking up in the hospital. Denies urinary/bowel incontinence. As per neuro normal brain MRI 2016. Patient denies chest pain, SOB, abd pain, N/V, fever, chills, dysuria or any other complaints. All remainder ROS negative. In ER found to have prolonged QTC:610 and was given magnesium 2grams. Patient remained asymptomatic. (28 Mar 2017 18:19)  Pt had EEG suspicious for epileptogenic activity on EEG.    Today:  Pt notes persistent 8/10 left pain alleviated with pain medication. Pt has nausea with vomiting.     ROS:   no fevers  no chills  no abd pain    HEALTH ISSUES - PROBLEM Dx:  UTI (urinary tract infection): UTI (urinary tract infection)  Prophylactic measure: Prophylactic measure  Hypothyroidism: Hypothyroidism  Prolonged QT interval: Prolonged QT interval  Seizure: Seizure  Syncope due to orthostatic hypotension: Syncope due to orthostatic hypotension    PAST MEDICAL & SURGICAL HISTORY:  Hypothyroidism  PVD (peripheral vascular disease)  Sjogren-Addy syndrome: herniated disc, thorasic  Cushings syndrome  Lupus  POTS (postural orthostatic tachycardia syndrome)  S/P hysterectomy: 2015  H/O foot surgery: left ankle   S/P tubal ligation: and ablation    infusive port   S/P  section:   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 1000milliLiter(s) IV Continuous <Continuous>  cefTRIAXone   IVPB 1Gram(s) IV Intermittent every 24 hours    MEDICATIONS  (PRN):  HYDROmorphone  Injectable 1milliGRAM(s) IV Push every 6 hours PRN Severe Pain (7 - 10)  oxyCODONE  5 mG/acetaminophen 325 mG 2Tablet(s) Oral every 6 hours PRN Moderate Pain (4 - 6)  metoclopramide Injectable 10milliGRAM(s) IV Push every 8 hours PRN vomiting    LABS:             8.8    5.1   )-----------( 258      ( 2017 06:36 )             27.1     2017 06:36    140    |  108    |  11.0   ----------------------------<  72     4.4     |  23.0   |  0.45     Ca    8.3        2017 06:36    PHYSICAL EXAM:  Vital Signs Last 24 Hrs  T(C): 36.5, Max: 37 ( @ 21:14)  T(F): 97.7, Max: 98.6 ( @ 21:14)  HR: 73 (73 - 78)  BP: 98/56 (92/58 - 98/56)  BP(mean): --  RR: 15 (15 - 16)  SpO2: 97% (97% - 99%)    GENERAL NAD, ALERT SITTING IN CHAIR  HEENT NORMAL CEPHALIC ATRAUMATIC, EOMI, PERRLA  CVS: S1,S2,RRR, NO MURMUR, NO RUBS, NO GALLOPS  RESP: BL CTA, NO WHEEZING, NO RHONCHI, NO CRACKLES, NO LABORED BREATHING  ABD: + LEFT FLANK. SOFT, NON TENDER, NON DISTENDED  EXT: NO EDEMA  SKIN: WARM, DRY, INTACT  NEURO AOX3  MUSCULOSKELETAL: ROM INTACT IN ALL 4 EXTREMITIES  RECTAL DEFFERED  PSYCH APPROPRIATE

## 2017-04-04 NOTE — PROGRESS NOTE ADULT - ASSESSMENT
56 F with PMH sjogrens syndrome with peripheral neuropathy, chronic inflammatory demyelinating polyneuritis, restless leg syndrome, dysautonomia, GI dysmotility, orthostatic hypotension (on midodrine), patient has prior multiple hospitalizations for unresponsiveness/ syncope as well as prior concern for self medication with narcotics/ benzos, currently presented s/p episode of unresponsiveness concern for syncope vs seizure, extensive hx of orthostatic hypotension now improving. Patient had abnormal ECHO for which she had CTA chest/abdomen performed with no evidence of any abnormalities. Patient also pending full neurological work up, pending MRI head/MRA to be hydrated with IVF today, received contrast 3/30/17and to have contrast for MRI today and c/w IVF. EEG also showed abnormality of paroxysmal sharp wave of generalized slowing potentially epileptogenic and neurology placed the patient on keppra.

## 2017-04-04 NOTE — PROGRESS NOTE ADULT - PROBLEM SELECTOR PLAN 2
Pt complaining of urinary frequency/burning, with hematuria   suspicious for nephrolithiasis. ct with renal study with left renal caliculus.  urology Dr Beltre re-consulted, cw ivfs and pain control.  completes 7 days rocephin   iv access obtained via mid line.  UA positive and Ucx shows >100,000 E. Coli   repeat ua ucx

## 2017-04-04 NOTE — PROGRESS NOTE ADULT - PROBLEM SELECTOR PROBLEM 1
Syncope due to orthostatic hypotension

## 2017-04-05 ENCOUNTER — TRANSCRIPTION ENCOUNTER (OUTPATIENT)
Age: 57
End: 2017-04-05

## 2017-04-05 VITALS
TEMPERATURE: 98 F | SYSTOLIC BLOOD PRESSURE: 145 MMHG | HEART RATE: 62 BPM | RESPIRATION RATE: 18 BRPM | DIASTOLIC BLOOD PRESSURE: 77 MMHG

## 2017-04-05 LAB
ANION GAP SERPL CALC-SCNC: 10 MMOL/L — SIGNIFICANT CHANGE UP (ref 5–17)
BUN SERPL-MCNC: 10 MG/DL — SIGNIFICANT CHANGE UP (ref 8–20)
CALCIUM SERPL-MCNC: 8.8 MG/DL — SIGNIFICANT CHANGE UP (ref 8.6–10.2)
CHLORIDE SERPL-SCNC: 110 MMOL/L — HIGH (ref 98–107)
CO2 SERPL-SCNC: 22 MMOL/L — SIGNIFICANT CHANGE UP (ref 22–29)
CREAT SERPL-MCNC: 0.45 MG/DL — LOW (ref 0.5–1.3)
CULTURE RESULTS: SIGNIFICANT CHANGE UP
GLUCOSE SERPL-MCNC: 84 MG/DL — SIGNIFICANT CHANGE UP (ref 70–115)
HCT VFR BLD CALC: 32.6 % — LOW (ref 37–47)
HGB BLD-MCNC: 10.2 G/DL — LOW (ref 12–16)
MCHC RBC-ENTMCNC: 31.2 PG — HIGH (ref 27–31)
MCHC RBC-ENTMCNC: 31.3 G/DL — LOW (ref 32–36)
MCV RBC AUTO: 99.7 FL — HIGH (ref 81–99)
PLATELET # BLD AUTO: 239 K/UL — SIGNIFICANT CHANGE UP (ref 150–400)
POTASSIUM SERPL-MCNC: 4 MMOL/L — SIGNIFICANT CHANGE UP (ref 3.5–5.3)
POTASSIUM SERPL-SCNC: 4 MMOL/L — SIGNIFICANT CHANGE UP (ref 3.5–5.3)
RBC # BLD: 3.27 M/UL — LOW (ref 4.4–5.2)
RBC # FLD: 17.7 % — HIGH (ref 11–15.6)
SODIUM SERPL-SCNC: 142 MMOL/L — SIGNIFICANT CHANGE UP (ref 135–145)
SPECIMEN SOURCE: SIGNIFICANT CHANGE UP
WBC # BLD: 6.2 K/UL — SIGNIFICANT CHANGE UP (ref 4.8–10.8)
WBC # FLD AUTO: 6.2 K/UL — SIGNIFICANT CHANGE UP (ref 4.8–10.8)

## 2017-04-05 PROCEDURE — 84480 ASSAY TRIIODOTHYRONINE (T3): CPT

## 2017-04-05 PROCEDURE — 74174 CTA ABD&PLVS W/CONTRAST: CPT

## 2017-04-05 PROCEDURE — 70544 MR ANGIOGRAPHY HEAD W/O DYE: CPT

## 2017-04-05 PROCEDURE — 71275 CT ANGIOGRAPHY CHEST: CPT

## 2017-04-05 PROCEDURE — 99285 EMERGENCY DEPT VISIT HI MDM: CPT | Mod: 25

## 2017-04-05 PROCEDURE — 81001 URINALYSIS AUTO W/SCOPE: CPT

## 2017-04-05 PROCEDURE — 82962 GLUCOSE BLOOD TEST: CPT

## 2017-04-05 PROCEDURE — 80048 BASIC METABOLIC PNL TOTAL CA: CPT

## 2017-04-05 PROCEDURE — A9579: CPT

## 2017-04-05 PROCEDURE — 87086 URINE CULTURE/COLONY COUNT: CPT

## 2017-04-05 PROCEDURE — 84100 ASSAY OF PHOSPHORUS: CPT

## 2017-04-05 PROCEDURE — 84436 ASSAY OF TOTAL THYROXINE: CPT

## 2017-04-05 PROCEDURE — 93880 EXTRACRANIAL BILAT STUDY: CPT

## 2017-04-05 PROCEDURE — 84439 ASSAY OF FREE THYROXINE: CPT

## 2017-04-05 PROCEDURE — 96374 THER/PROPH/DIAG INJ IV PUSH: CPT

## 2017-04-05 PROCEDURE — 70553 MRI BRAIN STEM W/O & W/DYE: CPT

## 2017-04-05 PROCEDURE — 84443 ASSAY THYROID STIM HORMONE: CPT

## 2017-04-05 PROCEDURE — 83690 ASSAY OF LIPASE: CPT

## 2017-04-05 PROCEDURE — 82533 TOTAL CORTISOL: CPT

## 2017-04-05 PROCEDURE — 81003 URINALYSIS AUTO W/O SCOPE: CPT

## 2017-04-05 PROCEDURE — 80307 DRUG TEST PRSMV CHEM ANLYZR: CPT

## 2017-04-05 PROCEDURE — 95819 EEG AWAKE AND ASLEEP: CPT

## 2017-04-05 PROCEDURE — 84484 ASSAY OF TROPONIN QUANT: CPT

## 2017-04-05 PROCEDURE — 87186 SC STD MICRODIL/AGAR DIL: CPT

## 2017-04-05 PROCEDURE — 70450 CT HEAD/BRAIN W/O DYE: CPT

## 2017-04-05 PROCEDURE — 74176 CT ABD & PELVIS W/O CONTRAST: CPT

## 2017-04-05 PROCEDURE — 80053 COMPREHEN METABOLIC PANEL: CPT

## 2017-04-05 PROCEDURE — 83735 ASSAY OF MAGNESIUM: CPT

## 2017-04-05 PROCEDURE — 93005 ELECTROCARDIOGRAM TRACING: CPT

## 2017-04-05 PROCEDURE — 93306 TTE W/DOPPLER COMPLETE: CPT

## 2017-04-05 PROCEDURE — 36415 COLL VENOUS BLD VENIPUNCTURE: CPT

## 2017-04-05 PROCEDURE — 99239 HOSP IP/OBS DSCHRG MGMT >30: CPT

## 2017-04-05 PROCEDURE — 85027 COMPLETE CBC AUTOMATED: CPT

## 2017-04-05 RX ORDER — LEVOTHYROXINE SODIUM 125 MCG
1 TABLET ORAL
Qty: 0 | Refills: 0 | COMMUNITY
Start: 2017-04-05

## 2017-04-05 RX ORDER — MIRTAZAPINE 45 MG/1
1 TABLET, ORALLY DISINTEGRATING ORAL
Qty: 0 | Refills: 0 | COMMUNITY

## 2017-04-05 RX ORDER — MIDODRINE HYDROCHLORIDE 2.5 MG/1
1 TABLET ORAL
Qty: 0 | Refills: 0 | COMMUNITY
Start: 2017-04-05

## 2017-04-05 RX ORDER — QUETIAPINE FUMARATE 200 MG/1
1 TABLET, FILM COATED ORAL
Qty: 0 | Refills: 0 | COMMUNITY

## 2017-04-05 RX ORDER — OXYCODONE HYDROCHLORIDE 5 MG/1
2 TABLET ORAL
Qty: 30 | Refills: 0 | OUTPATIENT
Start: 2017-04-05

## 2017-04-05 RX ORDER — LEVETIRACETAM 250 MG/1
1 TABLET, FILM COATED ORAL
Qty: 60 | Refills: 0 | OUTPATIENT
Start: 2017-04-05 | End: 2017-05-05

## 2017-04-05 RX ORDER — MOXIFLOXACIN HYDROCHLORIDE TABLETS, 400 MG 400 MG/1
1 TABLET, FILM COATED ORAL
Qty: 14 | Refills: 0 | OUTPATIENT
Start: 2017-04-05 | End: 2017-04-12

## 2017-04-05 RX ORDER — FLUDROCORTISONE ACETATE 0.1 MG/1
1 TABLET ORAL
Qty: 0 | Refills: 0 | COMMUNITY
Start: 2017-04-05

## 2017-04-05 RX ADMIN — FLUDROCORTISONE ACETATE 0.1 MILLIGRAM(S): 0.1 TABLET ORAL at 05:59

## 2017-04-05 RX ADMIN — SODIUM CHLORIDE 125 MILLILITER(S): 9 INJECTION INTRAMUSCULAR; INTRAVENOUS; SUBCUTANEOUS at 00:00

## 2017-04-05 RX ADMIN — Medication 75 MICROGRAM(S): at 05:59

## 2017-04-05 RX ADMIN — LEVETIRACETAM 500 MILLIGRAM(S): 250 TABLET, FILM COATED ORAL at 05:59

## 2017-04-05 RX ADMIN — MIDODRINE HYDROCHLORIDE 5 MILLIGRAM(S): 2.5 TABLET ORAL at 15:33

## 2017-04-05 RX ADMIN — SODIUM CHLORIDE 125 MILLILITER(S): 9 INJECTION INTRAMUSCULAR; INTRAVENOUS; SUBCUTANEOUS at 10:22

## 2017-04-05 NOTE — DISCHARGE NOTE ADULT - MEDICATION SUMMARY - MEDICATIONS TO STOP TAKING
I will STOP taking the medications listed below when I get home from the hospital:    Synthroid 50 mcg (0.05 mg) oral tablet  -- 1 tab(s) by mouth once a day    liothyronine 5 mcg oral tablet  -- 1 tab(s) by mouth 2 times a day    topiramate 200 mg oral tablet  -- 1 tab(s) by mouth 2 times a day    Remeron 15 mg oral tablet  -- 1 tab(s) by mouth once a day (at bedtime)    SEROquel 400 mg oral tablet  -- 1 tab(s) by mouth once a day (at bedtime)

## 2017-04-05 NOTE — DISCHARGE NOTE ADULT - HOSPITAL COURSE
56 F with PMH sjogrens syndrome with peripheral neuropathy, chronic inflammatory demyelinating polyneuritis, restless leg syndrome, dysautonomia, GI dysmotility, orthostatic hypotension (on midodrine), patient has prior multiple hospitalizations for unresponsiveness/ syncope Pt presented s/p episode of unresponsiveness concern for syncope vs seizure, extensive hx of orthostatic hypotension now improving. Patient had abnormal ECHO for which she had CTA chest/abdomen performed with no evidence of any abnormalities. EEG also showed abnormality of paroxysmal sharp wave of generalized slowing potentially epileptogenic and neurology placed the patient on keppra. - MRI/MRA head  wnl. Pt also noted to have uti which was treated with rocephin. CT renal study  significant for left renal caliculi. Urology consulted, started on ivfs with pain medications. Pt unable to pass stone. Pt states that she will follow up with her urologist Dr Howell if she is not able to pass stone at home. Will prescribe cipro abx and percocet for pain control. UA positive and Ucx shows >100,000 E. Coli   Prolonged QT interval. Remeron and seroquel was discontinued. pt to restart as per pmd on outpatient. Pt to have LOOP recorder if syncope reoccurs. 56 F with PMH sjogrens syndrome with peripheral neuropathy, chronic inflammatory demyelinating polyneuritis, restless leg syndrome, dysautonomia, GI dysmotility, orthostatic hypotension (on midodrine), patient has prior multiple hospitalizations for unresponsiveness/ syncope Pt presented s/p episode of unresponsiveness concern for syncope vs seizure, extensive hx of orthostatic hypotension now improving. Patient had abnormal ECHO for which she had CTA chest/abdomen performed with no evidence of any abnormalities. EEG also showed abnormality of paroxysmal sharp wave of generalized slowing potentially epileptogenic and neurology placed the patient on keppra. - MRI/MRA head  wnl. Pt also noted to have uti which was treated with rocephin. CT renal study  significant for left renal caliculi. Urology consulted, started on ivfs with pain medications. Pt unable to pass stone. Pt states that she will follow up with her urologist Dr Howell if she is not able to pass stone at home. Will prescribe cipro abx and percocet for pain control. UA positive and Ucx shows >100,000 E. Coli   Prolonged QT interval. Remeron and seroquel was discontinued. pt to restart as per pmd on outpatient. Pt to have LOOP recorder if syncope reoccurs.    General nad  Heent normal cephalic atraumatic    cvs s1s2, rrr  resp blcta  Abd soft, non tender, mild left flank tenderness  ext no edema  psych appropriate    I spent 40 minutes discharging this patient.

## 2017-04-05 NOTE — DISCHARGE NOTE ADULT - CARE PLAN
Principal Discharge DX:	Seizure  Goal:	seizure control  Instructions for follow-up, activity and diet:	started on keppra.  neurology follow up  Secondary Diagnosis:	Prolonged QT interval  Instructions for follow-up, activity and diet:	remeron and seroquel discontinued due to prolonged QT which has resolved. Pt to follow up with pmd prior to restarting  Secondary Diagnosis:	Hypothyroidism, unspecified type  Instructions for follow-up, activity and diet:	increased to 75mg levothyroxine  Secondary Diagnosis:	Urinary tract infection with hematuria, site unspecified  Instructions for follow-up, activity and diet:	complicated with 0.4 left renal cortex nephrolithiasis  sp rocephin, To complete course with cipro at home  pt to see dr tesfaye from urology on outpatient.

## 2017-04-05 NOTE — DISCHARGE NOTE ADULT - PROVIDER TOKENS
FREE:[LAST:[PMD],PHONE:[(   )    -],FAX:[(   )    -],ADDRESS:[PMD FOLLOW UP KRISTOFERN 1 WEEK.]],FREE:[LAST:[UROLOGY],PHONE:[(   )    -],FAX:[(   )    -],ADDRESS:[UROLOGY FOLLOW UP WITH DR ROSEN WITHIN 1 WEEK]]

## 2017-04-05 NOTE — DISCHARGE NOTE ADULT - CARE PROVIDER_API CALL
PMD,   PMD FOLLOW UP ALBERTA 1 WEEK.  Phone: (   )    -  Fax: (   )    -    UROLOGY,   UROLOGY FOLLOW UP WITH DR ROSEN WITHIN 1 WEEK  Phone: (   )    -  Fax: (   )    -

## 2017-04-05 NOTE — DISCHARGE NOTE ADULT - PLAN OF CARE
seizure control started on keppra.  neurology follow up remeron and seroquel discontinued due to prolonged QT which has resolved. Pt to follow up with pmd prior to restarting increased to 75mg levothyroxine complicated with 0.4 left renal cortex nephrolithiasis  sp rocephin, To complete course with cipro at home  pt to see dr tesfaye from urology on outpatient.

## 2017-04-05 NOTE — DISCHARGE NOTE ADULT - PATIENT PORTAL LINK FT
“You can access the Follow[facility] Patient Portal, offered by [facility], by registering with the following website: [email or web address]”

## 2017-04-05 NOTE — DISCHARGE NOTE ADULT - MEDICATION SUMMARY - MEDICATIONS TO TAKE
I will START or STAY ON the medications listed below when I get home from the hospital:    fludrocortisone 0.1 mg oral tablet  -- 1 tab(s) by mouth once a day  -- Indication: For Hypotension    acetaminophen-oxyCODONE 325 mg-5 mg oral tablet  -- 2 tab(s) by mouth every 6 hours, As needed, Moderate Pain (4 - 6) MDD:8 tabs daily  -- Indication: For Pain    levETIRAcetam 500 mg oral tablet  -- 1 tab(s) by mouth 2 times a day  -- Indication: For Seizure     Requip 0.5 mg oral tablet  -- 1 tab(s) by mouth 2 times a day  -- Indication: For autoimmume suppresant    Amitiza 24 mcg oral capsule  -- 1 cap(s) by mouth 2 times a day  -- Indication: For bowel regiment    Gammagard 10% injectable solution  --  injectable once a month  -- Indication: For Hypothyroidism    docusate sodium 100 mg oral capsule  -- 1 cap(s) by mouth 3 times a day, As needed, Stool Softening  -- Indication: For constipation    sodium chloride 1000 mg oral tablet  -- 1 tab(s) by mouth 2 times a day  -- Indication: For Supplement    Mag-Ox 400  -- 1 tab(s) by mouth 3 times a day  -- Indication: For Supplement    potassium chloride 20 mEq oral granule, extended release  -- 1 tab(s) by mouth 2 times a day  -- Indication: For Supplement    midodrine 5 mg oral tablet  -- 1 tab(s) by mouth once a day  -- Indication: For Syncope due to orthostatic hypotension    Cipro 500 mg oral tablet  -- 1 tab(s) by mouth every 12 hours  -- Avoid prolonged or excessive exposure to direct and/or artificial sunlight while taking this medication.  Check with your doctor before becoming pregnant.  Do not take dairy products, antacids, or iron preparations within one hour of this medication.  Finish all this medication unless otherwise directed by prescriber.  Medication should be taken with plenty of water.    -- Indication: For UTI (urinary tract infection)    levothyroxine 75 mcg (0.075 mg) oral tablet  -- 1 tab(s) by mouth once a day  -- Indication: For Hypothyroidism    Northera 200 mg oral capsule  -- 1 cap(s) by mouth 2 times a day  -- Indication: For Syncope due to orthostatic hypotension    Multiple Vitamins oral tablet  -- 1 tab(s) by mouth once a day  -- Indication: For Supplement    Vitamin D2 50,000 intl units (1.25 mg) oral capsule  -- 1 cap(s) by mouth 3 times a week  -- Indication: For Supplement

## 2017-04-05 NOTE — DISCHARGE NOTE ADULT - SECONDARY DIAGNOSIS.
Prolonged QT interval Hypothyroidism, unspecified type Urinary tract infection with hematuria, site unspecified

## 2017-04-19 ENCOUNTER — EMERGENCY (EMERGENCY)
Facility: HOSPITAL | Age: 57
LOS: 1 days | Discharge: HOME | End: 2017-04-19
Attending: EMERGENCY MEDICINE
Payer: MEDICARE

## 2017-04-19 VITALS
TEMPERATURE: 100 F | DIASTOLIC BLOOD PRESSURE: 70 MMHG | WEIGHT: 119.05 LBS | RESPIRATION RATE: 16 BRPM | HEIGHT: 61 IN | SYSTOLIC BLOOD PRESSURE: 102 MMHG | HEART RATE: 117 BPM | OXYGEN SATURATION: 99 %

## 2017-04-19 DIAGNOSIS — Z98.89 OTHER SPECIFIED POSTPROCEDURAL STATES: Chronic | ICD-10-CM

## 2017-04-19 DIAGNOSIS — Z79.899 OTHER LONG TERM (CURRENT) DRUG THERAPY: ICD-10-CM

## 2017-04-19 DIAGNOSIS — Z90.710 ACQUIRED ABSENCE OF BOTH CERVIX AND UTERUS: Chronic | ICD-10-CM

## 2017-04-19 DIAGNOSIS — Z90.49 ACQUIRED ABSENCE OF OTHER SPECIFIED PARTS OF DIGESTIVE TRACT: ICD-10-CM

## 2017-04-19 DIAGNOSIS — Z91.041 RADIOGRAPHIC DYE ALLERGY STATUS: ICD-10-CM

## 2017-04-19 DIAGNOSIS — Z90.710 ACQUIRED ABSENCE OF BOTH CERVIX AND UTERUS: ICD-10-CM

## 2017-04-19 DIAGNOSIS — E27.9 DISORDER OF ADRENAL GLAND, UNSPECIFIED: Chronic | ICD-10-CM

## 2017-04-19 DIAGNOSIS — R10.9 UNSPECIFIED ABDOMINAL PAIN: ICD-10-CM

## 2017-04-19 DIAGNOSIS — Z90.49 ACQUIRED ABSENCE OF OTHER SPECIFIED PARTS OF DIGESTIVE TRACT: Chronic | ICD-10-CM

## 2017-04-19 DIAGNOSIS — E03.9 HYPOTHYROIDISM, UNSPECIFIED: ICD-10-CM

## 2017-04-19 DIAGNOSIS — Z98.51 TUBAL LIGATION STATUS: ICD-10-CM

## 2017-04-19 DIAGNOSIS — N39.0 URINARY TRACT INFECTION, SITE NOT SPECIFIED: ICD-10-CM

## 2017-04-19 DIAGNOSIS — Z98.51 TUBAL LIGATION STATUS: Chronic | ICD-10-CM

## 2017-04-19 DIAGNOSIS — Z88.8 ALLERGY STATUS TO OTHER DRUGS, MEDICAMENTS AND BIOLOGICAL SUBSTANCES STATUS: ICD-10-CM

## 2017-04-19 DIAGNOSIS — Z98.890 OTHER SPECIFIED POSTPROCEDURAL STATES: ICD-10-CM

## 2017-04-19 LAB
ALBUMIN SERPL ELPH-MCNC: 4.1 G/DL — SIGNIFICANT CHANGE UP (ref 3.3–5.2)
ALP SERPL-CCNC: 110 U/L — SIGNIFICANT CHANGE UP (ref 40–120)
ALT FLD-CCNC: 12 U/L — SIGNIFICANT CHANGE UP
ANION GAP SERPL CALC-SCNC: 14 MMOL/L — SIGNIFICANT CHANGE UP (ref 5–17)
APPEARANCE UR: ABNORMAL
AST SERPL-CCNC: 19 U/L — SIGNIFICANT CHANGE UP
BACTERIA # UR AUTO: ABNORMAL
BASOPHILS # BLD AUTO: 0.1 K/UL — SIGNIFICANT CHANGE UP (ref 0–0.2)
BASOPHILS NFR BLD AUTO: 0.5 % — SIGNIFICANT CHANGE UP (ref 0–2)
BILIRUB SERPL-MCNC: 0.1 MG/DL — LOW (ref 0.4–2)
BILIRUB UR-MCNC: NEGATIVE — SIGNIFICANT CHANGE UP
BUN SERPL-MCNC: 22 MG/DL — HIGH (ref 8–20)
CALCIUM SERPL-MCNC: 9 MG/DL — SIGNIFICANT CHANGE UP (ref 8.6–10.2)
CHLORIDE SERPL-SCNC: 99 MMOL/L — SIGNIFICANT CHANGE UP (ref 98–107)
CO2 SERPL-SCNC: 22 MMOL/L — SIGNIFICANT CHANGE UP (ref 22–29)
COLOR SPEC: YELLOW — SIGNIFICANT CHANGE UP
CREAT SERPL-MCNC: 0.73 MG/DL — SIGNIFICANT CHANGE UP (ref 0.5–1.3)
DIFF PNL FLD: ABNORMAL
EPI CELLS # UR: ABNORMAL
GLUCOSE SERPL-MCNC: 101 MG/DL — SIGNIFICANT CHANGE UP (ref 70–115)
GLUCOSE UR QL: NEGATIVE MG/DL — SIGNIFICANT CHANGE UP
HCG SERPL-ACNC: <2 MIU/ML — SIGNIFICANT CHANGE UP
HCT VFR BLD CALC: 34.1 % — LOW (ref 37–47)
HGB BLD-MCNC: 11.1 G/DL — LOW (ref 12–16)
KETONES UR-MCNC: NEGATIVE — SIGNIFICANT CHANGE UP
LEUKOCYTE ESTERASE UR-ACNC: ABNORMAL
LYMPHOCYTES # BLD AUTO: 27.7 % — SIGNIFICANT CHANGE UP (ref 20–55)
LYMPHOCYTES # BLD AUTO: 3.6 K/UL — SIGNIFICANT CHANGE UP (ref 1–4.8)
MCHC RBC-ENTMCNC: 31.7 PG — HIGH (ref 27–31)
MCHC RBC-ENTMCNC: 32.6 G/DL — SIGNIFICANT CHANGE UP (ref 32–36)
MCV RBC AUTO: 97.4 FL — SIGNIFICANT CHANGE UP (ref 81–99)
MONOCYTES # BLD AUTO: 1 K/UL — HIGH (ref 0–0.8)
MONOCYTES NFR BLD AUTO: 7.7 % — SIGNIFICANT CHANGE UP (ref 3–10)
NEUTROPHILS # BLD AUTO: 8.2 K/UL — HIGH (ref 1.8–8)
NEUTROPHILS NFR BLD AUTO: 63.7 % — SIGNIFICANT CHANGE UP (ref 37–73)
NITRITE UR-MCNC: NEGATIVE — SIGNIFICANT CHANGE UP
PH UR: 6 — SIGNIFICANT CHANGE UP (ref 5–8)
PLATELET # BLD AUTO: 343 K/UL — SIGNIFICANT CHANGE UP (ref 150–400)
POTASSIUM SERPL-MCNC: 4.4 MMOL/L — SIGNIFICANT CHANGE UP (ref 3.5–5.3)
POTASSIUM SERPL-SCNC: 4.4 MMOL/L — SIGNIFICANT CHANGE UP (ref 3.5–5.3)
PROT SERPL-MCNC: 8.1 G/DL — SIGNIFICANT CHANGE UP (ref 6.6–8.7)
PROT UR-MCNC: 30 MG/DL
RBC # BLD: 3.5 M/UL — LOW (ref 4.4–5.2)
RBC # FLD: 16.3 % — HIGH (ref 11–15.6)
RBC CASTS # UR COMP ASSIST: ABNORMAL /HPF (ref 0–4)
SODIUM SERPL-SCNC: 135 MMOL/L — SIGNIFICANT CHANGE UP (ref 135–145)
SP GR SPEC: 1.02 — SIGNIFICANT CHANGE UP (ref 1.01–1.02)
UROBILINOGEN FLD QL: NEGATIVE MG/DL — SIGNIFICANT CHANGE UP
WBC # BLD: 12.9 K/UL — HIGH (ref 4.8–10.8)
WBC # FLD AUTO: 12.9 K/UL — HIGH (ref 4.8–10.8)
WBC UR QL: ABNORMAL

## 2017-04-19 PROCEDURE — 99285 EMERGENCY DEPT VISIT HI MDM: CPT

## 2017-04-19 PROCEDURE — 74176 CT ABD & PELVIS W/O CONTRAST: CPT | Mod: 26

## 2017-04-19 RX ORDER — ONDANSETRON 8 MG/1
4 TABLET, FILM COATED ORAL ONCE
Qty: 0 | Refills: 0 | Status: COMPLETED | OUTPATIENT
Start: 2017-04-19 | End: 2017-04-19

## 2017-04-19 RX ORDER — SODIUM CHLORIDE 9 MG/ML
1000 INJECTION INTRAMUSCULAR; INTRAVENOUS; SUBCUTANEOUS ONCE
Qty: 0 | Refills: 0 | Status: COMPLETED | OUTPATIENT
Start: 2017-04-19 | End: 2017-04-19

## 2017-04-19 RX ORDER — MORPHINE SULFATE 50 MG/1
4 CAPSULE, EXTENDED RELEASE ORAL ONCE
Qty: 0 | Refills: 0 | Status: DISCONTINUED | OUTPATIENT
Start: 2017-04-19 | End: 2017-04-19

## 2017-04-19 RX ADMIN — MORPHINE SULFATE 4 MILLIGRAM(S): 50 CAPSULE, EXTENDED RELEASE ORAL at 22:10

## 2017-04-19 RX ADMIN — SODIUM CHLORIDE 1000 MILLILITER(S): 9 INJECTION INTRAMUSCULAR; INTRAVENOUS; SUBCUTANEOUS at 21:53

## 2017-04-19 RX ADMIN — ONDANSETRON 4 MILLIGRAM(S): 8 TABLET, FILM COATED ORAL at 21:53

## 2017-04-19 RX ADMIN — MORPHINE SULFATE 4 MILLIGRAM(S): 50 CAPSULE, EXTENDED RELEASE ORAL at 21:53

## 2017-04-19 NOTE — ED PROVIDER NOTE - OBJECTIVE STATEMENT
Patient is a 56 y/o female PMH sjogrens syndrome with peripheral neuropathy, chronic inflammatory demyelinating polyneuritis, restless leg syndrome, dysautonomia, GI dysmotility, orthostatic hypotension (on midodrine), patient has prior multiple hospitalizations for unresponsiveness/ syncope as well as prior concern for self medication with narcotics/ benzos. Patient follows with neurologist Dr. Brewer and is on treatment with IVIG and rituxan. Patient presents today with left flank pain, sharp non radiating, 8/10 that has become progressively worse for the past 2 days with associated dysuria. Patient denies N/V/D constipation, recent illness, or recent sick contact.

## 2017-04-19 NOTE — ED PROVIDER NOTE - PROGRESS NOTE DETAILS
Pt received IV Rocephin and slept comfortably in ED, Pt awake at this time and is stable for d/c.  Rx Keflex with f/u as outpt as per sign out from Dr Matias pt is discharged just ask put in d/c order

## 2017-04-19 NOTE — ED PROVIDER NOTE - ATTENDING CONTRIBUTION TO CARE
I, Álvaro Moore, performed the initial face to face bedside interview with this patient regarding history of present illness, review of symptoms and relevant past medical, social and family history.  I completed an independent physical examination.  I was the initial provider who evaluated this patient. I have signed out the follow up of any pending tests (i.e. labs, radiological studies) to the resident.  I have communicated the patient’s plan of care and disposition with the resident.

## 2017-04-20 VITALS
SYSTOLIC BLOOD PRESSURE: 94 MMHG | OXYGEN SATURATION: 98 % | RESPIRATION RATE: 18 BRPM | HEART RATE: 88 BPM | TEMPERATURE: 99 F | DIASTOLIC BLOOD PRESSURE: 66 MMHG

## 2017-04-20 LAB
CULTURE RESULTS: NO GROWTH — SIGNIFICANT CHANGE UP
SPECIMEN SOURCE: SIGNIFICANT CHANGE UP

## 2017-04-20 PROCEDURE — 85027 COMPLETE CBC AUTOMATED: CPT

## 2017-04-20 PROCEDURE — 80053 COMPREHEN METABOLIC PANEL: CPT

## 2017-04-20 PROCEDURE — 99284 EMERGENCY DEPT VISIT MOD MDM: CPT | Mod: 25

## 2017-04-20 PROCEDURE — 84702 CHORIONIC GONADOTROPIN TEST: CPT

## 2017-04-20 PROCEDURE — 96375 TX/PRO/DX INJ NEW DRUG ADDON: CPT

## 2017-04-20 PROCEDURE — 96374 THER/PROPH/DIAG INJ IV PUSH: CPT

## 2017-04-20 PROCEDURE — 74176 CT ABD & PELVIS W/O CONTRAST: CPT

## 2017-04-20 PROCEDURE — 81001 URINALYSIS AUTO W/SCOPE: CPT

## 2017-04-20 PROCEDURE — 87086 URINE CULTURE/COLONY COUNT: CPT

## 2017-04-20 RX ORDER — CEFTRIAXONE 500 MG/1
1 INJECTION, POWDER, FOR SOLUTION INTRAMUSCULAR; INTRAVENOUS ONCE
Qty: 0 | Refills: 0 | Status: COMPLETED | OUTPATIENT
Start: 2017-04-20 | End: 2017-04-20

## 2017-04-20 RX ORDER — CEPHALEXIN 500 MG
1 CAPSULE ORAL
Qty: 21 | Refills: 0 | OUTPATIENT
Start: 2017-04-20 | End: 2017-04-27

## 2017-04-20 RX ORDER — SODIUM CHLORIDE 9 MG/ML
1000 INJECTION INTRAMUSCULAR; INTRAVENOUS; SUBCUTANEOUS ONCE
Qty: 0 | Refills: 0 | Status: COMPLETED | OUTPATIENT
Start: 2017-04-20 | End: 2017-04-20

## 2017-04-20 RX ADMIN — SODIUM CHLORIDE 1000 MILLILITER(S): 9 INJECTION INTRAMUSCULAR; INTRAVENOUS; SUBCUTANEOUS at 07:01

## 2017-04-20 RX ADMIN — CEFTRIAXONE 100 GRAM(S): 500 INJECTION, POWDER, FOR SOLUTION INTRAMUSCULAR; INTRAVENOUS at 01:13

## 2017-06-12 ENCOUNTER — APPOINTMENT (OUTPATIENT)
Dept: OBGYN | Facility: CLINIC | Age: 57
End: 2017-06-12

## 2017-06-12 VITALS
SYSTOLIC BLOOD PRESSURE: 110 MMHG | HEIGHT: 61 IN | WEIGHT: 123 LBS | BODY MASS INDEX: 23.22 KG/M2 | DIASTOLIC BLOOD PRESSURE: 80 MMHG

## 2017-06-12 DIAGNOSIS — Z87.898 PERSONAL HISTORY OF OTHER SPECIFIED CONDITIONS: ICD-10-CM

## 2017-06-12 DIAGNOSIS — A49.9 URINARY TRACT INFECTION, SITE NOT SPECIFIED: ICD-10-CM

## 2017-06-12 DIAGNOSIS — N39.0 URINARY TRACT INFECTION, SITE NOT SPECIFIED: ICD-10-CM

## 2017-06-12 LAB
BILIRUB UR QL STRIP: NORMAL
CLARITY UR: NORMAL
COLLECTION METHOD: NORMAL
GLUCOSE UR-MCNC: NORMAL
HCG UR QL: 0.2 EU/DL
HGB UR QL STRIP.AUTO: NORMAL
KETONES UR-MCNC: NORMAL
LEUKOCYTE ESTERASE UR QL STRIP: NORMAL
NITRITE UR QL STRIP: NORMAL
PH UR STRIP: 6.5
PROT UR STRIP-MCNC: NORMAL
SP GR UR STRIP: 1.02

## 2017-06-12 RX ORDER — NITROFURANTOIN MACROCRYSTALS 100 MG/1
100 CAPSULE ORAL
Qty: 10 | Refills: 0 | Status: ACTIVE | COMMUNITY
Start: 2017-06-12 | End: 1900-01-01

## 2017-06-12 RX ORDER — METRONIDAZOLE 500 MG/1
500 TABLET ORAL TWICE DAILY
Qty: 10 | Refills: 1 | Status: ACTIVE | COMMUNITY
Start: 2017-06-12 | End: 1900-01-01

## 2017-06-12 RX ORDER — AMOXICILLIN 500 MG/1
500 CAPSULE ORAL
Qty: 21 | Refills: 0 | Status: COMPLETED | COMMUNITY
Start: 2016-12-27

## 2017-06-13 LAB
APPEARANCE: CLEAR
BACTERIA: ABNORMAL
BILIRUBIN URINE: NEGATIVE
BLOOD URINE: NEGATIVE
COLOR: YELLOW
GLUCOSE QUALITATIVE U: NORMAL MG/DL
HYALINE CASTS: 0 /LPF
KETONES URINE: NEGATIVE
LEUKOCYTE ESTERASE URINE: ABNORMAL
MICROSCOPIC-UA: NORMAL
NITRITE URINE: NEGATIVE
PH URINE: 6.5
PROTEIN URINE: NEGATIVE MG/DL
RED BLOOD CELLS URINE: 2 /HPF
SPECIFIC GRAVITY URINE: 1.01
SQUAMOUS EPITHELIAL CELLS: 2 /HPF
UROBILINOGEN URINE: NORMAL MG/DL
WHITE BLOOD CELLS URINE: 56 /HPF

## 2017-06-14 LAB — BACTERIA UR CULT: NORMAL

## 2017-08-11 ENCOUNTER — APPOINTMENT (OUTPATIENT)
Dept: DERMATOLOGY | Facility: CLINIC | Age: 57
End: 2017-08-11
Payer: MEDICARE

## 2017-08-11 PROCEDURE — 99202 OFFICE O/P NEW SF 15 MIN: CPT

## 2017-08-17 ENCOUNTER — APPOINTMENT (OUTPATIENT)
Dept: DERMATOLOGY | Facility: CLINIC | Age: 57
End: 2017-08-17

## 2018-01-28 ENCOUNTER — TRANSCRIPTION ENCOUNTER (OUTPATIENT)
Age: 58
End: 2018-01-28

## 2018-06-19 ENCOUNTER — EMERGENCY (EMERGENCY)
Facility: HOSPITAL | Age: 58
LOS: 1 days | Discharge: HOME | End: 2018-06-19
Attending: EMERGENCY MEDICINE
Payer: COMMERCIAL

## 2018-06-19 VITALS
SYSTOLIC BLOOD PRESSURE: 116 MMHG | HEART RATE: 98 BPM | DIASTOLIC BLOOD PRESSURE: 81 MMHG | TEMPERATURE: 98 F | RESPIRATION RATE: 16 BRPM | OXYGEN SATURATION: 99 %

## 2018-06-19 VITALS
RESPIRATION RATE: 20 BRPM | SYSTOLIC BLOOD PRESSURE: 124 MMHG | HEART RATE: 84 BPM | DIASTOLIC BLOOD PRESSURE: 76 MMHG | TEMPERATURE: 98 F | OXYGEN SATURATION: 98 %

## 2018-06-19 DIAGNOSIS — Z98.89 OTHER SPECIFIED POSTPROCEDURAL STATES: Chronic | ICD-10-CM

## 2018-06-19 DIAGNOSIS — Z90.710 ACQUIRED ABSENCE OF BOTH CERVIX AND UTERUS: Chronic | ICD-10-CM

## 2018-06-19 DIAGNOSIS — Z98.51 TUBAL LIGATION STATUS: Chronic | ICD-10-CM

## 2018-06-19 DIAGNOSIS — Z90.49 ACQUIRED ABSENCE OF OTHER SPECIFIED PARTS OF DIGESTIVE TRACT: Chronic | ICD-10-CM

## 2018-06-19 DIAGNOSIS — E27.9 DISORDER OF ADRENAL GLAND, UNSPECIFIED: Chronic | ICD-10-CM

## 2018-06-19 LAB
ALBUMIN SERPL ELPH-MCNC: 4.1 G/DL — SIGNIFICANT CHANGE UP (ref 3.3–5.2)
ALP SERPL-CCNC: 113 U/L — SIGNIFICANT CHANGE UP (ref 40–120)
ALT FLD-CCNC: 46 U/L — HIGH
ANION GAP SERPL CALC-SCNC: 17 MMOL/L — SIGNIFICANT CHANGE UP (ref 5–17)
APTT BLD: 34 SEC — SIGNIFICANT CHANGE UP (ref 27.5–37.4)
AST SERPL-CCNC: 86 U/L — HIGH
BASOPHILS # BLD AUTO: 0 K/UL — SIGNIFICANT CHANGE UP (ref 0–0.2)
BASOPHILS NFR BLD AUTO: 0.2 % — SIGNIFICANT CHANGE UP (ref 0–2)
BILIRUB SERPL-MCNC: 0.3 MG/DL — LOW (ref 0.4–2)
BUN SERPL-MCNC: 15 MG/DL — SIGNIFICANT CHANGE UP (ref 8–20)
CALCIUM SERPL-MCNC: 9 MG/DL — SIGNIFICANT CHANGE UP (ref 8.6–10.2)
CHLORIDE SERPL-SCNC: 98 MMOL/L — SIGNIFICANT CHANGE UP (ref 98–107)
CO2 SERPL-SCNC: 16 MMOL/L — LOW (ref 22–29)
CREAT SERPL-MCNC: 0.81 MG/DL — SIGNIFICANT CHANGE UP (ref 0.5–1.3)
EOSINOPHIL # BLD AUTO: 0.1 K/UL — SIGNIFICANT CHANGE UP (ref 0–0.5)
EOSINOPHIL NFR BLD AUTO: 0.4 % — SIGNIFICANT CHANGE UP (ref 0–6)
GLUCOSE SERPL-MCNC: 80 MG/DL — SIGNIFICANT CHANGE UP (ref 70–115)
HCT VFR BLD CALC: 38.5 % — SIGNIFICANT CHANGE UP (ref 37–47)
HGB BLD-MCNC: 13.1 G/DL — SIGNIFICANT CHANGE UP (ref 12–16)
INR BLD: 1.08 RATIO — SIGNIFICANT CHANGE UP (ref 0.88–1.16)
LYMPHOCYTES # BLD AUTO: 1.1 K/UL — SIGNIFICANT CHANGE UP (ref 1–4.8)
LYMPHOCYTES # BLD AUTO: 8 % — LOW (ref 20–55)
MCHC RBC-ENTMCNC: 32.3 PG — HIGH (ref 27–31)
MCHC RBC-ENTMCNC: 34 G/DL — SIGNIFICANT CHANGE UP (ref 32–36)
MCV RBC AUTO: 94.8 FL — SIGNIFICANT CHANGE UP (ref 81–99)
MONOCYTES # BLD AUTO: 0.6 K/UL — SIGNIFICANT CHANGE UP (ref 0–0.8)
MONOCYTES NFR BLD AUTO: 4.8 % — SIGNIFICANT CHANGE UP (ref 3–10)
NEUTROPHILS # BLD AUTO: 11.6 K/UL — HIGH (ref 1.8–8)
NEUTROPHILS NFR BLD AUTO: 86.3 % — HIGH (ref 37–73)
PLATELET # BLD AUTO: 248 K/UL — SIGNIFICANT CHANGE UP (ref 150–400)
POTASSIUM SERPL-MCNC: 4.2 MMOL/L — SIGNIFICANT CHANGE UP (ref 3.5–5.3)
POTASSIUM SERPL-SCNC: 4.2 MMOL/L — SIGNIFICANT CHANGE UP (ref 3.5–5.3)
PROT SERPL-MCNC: 9.3 G/DL — HIGH (ref 6.6–8.7)
PROTHROM AB SERPL-ACNC: 11.9 SEC — SIGNIFICANT CHANGE UP (ref 9.8–12.7)
RBC # BLD: 4.06 M/UL — LOW (ref 4.4–5.2)
RBC # FLD: 13.6 % — SIGNIFICANT CHANGE UP (ref 11–15.6)
SODIUM SERPL-SCNC: 131 MMOL/L — LOW (ref 135–145)
WBC # BLD: 13.5 K/UL — HIGH (ref 4.8–10.8)
WBC # FLD AUTO: 13.5 K/UL — HIGH (ref 4.8–10.8)

## 2018-06-19 PROCEDURE — 25605 CLTX DST RDL FX/EPHYS SEP W/: CPT | Mod: LT

## 2018-06-19 PROCEDURE — 85730 THROMBOPLASTIN TIME PARTIAL: CPT

## 2018-06-19 PROCEDURE — 96374 THER/PROPH/DIAG INJ IV PUSH: CPT | Mod: XU

## 2018-06-19 PROCEDURE — 82962 GLUCOSE BLOOD TEST: CPT

## 2018-06-19 PROCEDURE — 73110 X-RAY EXAM OF WRIST: CPT | Mod: 26,77,LT

## 2018-06-19 PROCEDURE — 99285 EMERGENCY DEPT VISIT HI MDM: CPT | Mod: 25

## 2018-06-19 PROCEDURE — 73110 X-RAY EXAM OF WRIST: CPT | Mod: 26,LT

## 2018-06-19 PROCEDURE — 73100 X-RAY EXAM OF WRIST: CPT

## 2018-06-19 PROCEDURE — 36415 COLL VENOUS BLD VENIPUNCTURE: CPT

## 2018-06-19 PROCEDURE — 71045 X-RAY EXAM CHEST 1 VIEW: CPT

## 2018-06-19 PROCEDURE — 96375 TX/PRO/DX INJ NEW DRUG ADDON: CPT | Mod: XU

## 2018-06-19 PROCEDURE — 73110 X-RAY EXAM OF WRIST: CPT

## 2018-06-19 PROCEDURE — 72170 X-RAY EXAM OF PELVIS: CPT | Mod: 26

## 2018-06-19 PROCEDURE — 85610 PROTHROMBIN TIME: CPT

## 2018-06-19 PROCEDURE — 70450 CT HEAD/BRAIN W/O DYE: CPT

## 2018-06-19 PROCEDURE — 85027 COMPLETE CBC AUTOMATED: CPT

## 2018-06-19 PROCEDURE — 80053 COMPREHEN METABOLIC PANEL: CPT

## 2018-06-19 PROCEDURE — 71045 X-RAY EXAM CHEST 1 VIEW: CPT | Mod: 26

## 2018-06-19 PROCEDURE — 74177 CT ABD & PELVIS W/CONTRAST: CPT | Mod: 26

## 2018-06-19 PROCEDURE — 70450 CT HEAD/BRAIN W/O DYE: CPT | Mod: 26

## 2018-06-19 PROCEDURE — 74177 CT ABD & PELVIS W/CONTRAST: CPT

## 2018-06-19 PROCEDURE — 72170 X-RAY EXAM OF PELVIS: CPT

## 2018-06-19 PROCEDURE — 99285 EMERGENCY DEPT VISIT HI MDM: CPT

## 2018-06-19 RX ORDER — KETOROLAC TROMETHAMINE 30 MG/ML
15 SYRINGE (ML) INJECTION ONCE
Qty: 0 | Refills: 0 | Status: DISCONTINUED | OUTPATIENT
Start: 2018-06-19 | End: 2018-06-19

## 2018-06-19 RX ORDER — ONDANSETRON 8 MG/1
4 TABLET, FILM COATED ORAL ONCE
Qty: 0 | Refills: 0 | Status: COMPLETED | OUTPATIENT
Start: 2018-06-19 | End: 2018-06-19

## 2018-06-19 RX ORDER — QUETIAPINE FUMARATE 200 MG/1
2 TABLET, FILM COATED ORAL
Qty: 0 | Refills: 0 | COMMUNITY

## 2018-06-19 RX ORDER — ROPINIROLE 8 MG/1
1 TABLET, FILM COATED, EXTENDED RELEASE ORAL
Qty: 0 | Refills: 0 | COMMUNITY

## 2018-06-19 RX ORDER — MORPHINE SULFATE 50 MG/1
4 CAPSULE, EXTENDED RELEASE ORAL ONCE
Qty: 0 | Refills: 0 | Status: DISCONTINUED | OUTPATIENT
Start: 2018-06-19 | End: 2018-06-19

## 2018-06-19 RX ORDER — DROXIDOPA 100 MG/1
1 CAPSULE ORAL
Qty: 0 | Refills: 0 | COMMUNITY

## 2018-06-19 RX ADMIN — ONDANSETRON 4 MILLIGRAM(S): 8 TABLET, FILM COATED ORAL at 15:40

## 2018-06-19 RX ADMIN — MORPHINE SULFATE 4 MILLIGRAM(S): 50 CAPSULE, EXTENDED RELEASE ORAL at 15:39

## 2018-06-19 RX ADMIN — MORPHINE SULFATE 4 MILLIGRAM(S): 50 CAPSULE, EXTENDED RELEASE ORAL at 18:59

## 2018-06-19 RX ADMIN — Medication 15 MILLIGRAM(S): at 22:06

## 2018-06-19 NOTE — H&P ADULT - ATTENDING COMMENTS
No concussive symptoms appreciated on examination. Patient would like to go home and lives with family members.  Discharge to home. patient to follow up with ortho for left wrist fracture.
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
## 2018-06-19 NOTE — ED ADULT NURSE REASSESSMENT NOTE - NS ED NURSE REASSESS COMMENT FT1
Per Dr. Mayorga, pt became confused, priority ct called, ed ct made aware and ed radiology made aware of order for post reduction x-ray as per Dr. Mayorga. Pt now alert and oriented x 4.
Report given and pt endorsed to receiving YOLANDA Whitaker for follow up and continuity of care.
Report rec'd.  Pt reassessed.  Pt has left arm in cast upon exam.  PT reports left arm and back pain.  MD aware.

## 2018-06-19 NOTE — PROCEDURE NOTE - PROCEDURE
<<-----Click on this checkbox to enter Procedure Closed reduction of fracture of distal radius  06/19/2018    Active  CCOONS1

## 2018-06-19 NOTE — H&P ADULT - NSHPLABSRESULTS_GEN_ALL_CORE
LABS:                        13.1   13.5  )-----------( 248      ( 19 Jun 2018 16:49 )             38.5     06-19    131<L>  |  98  |  15.0  ----------------------------<  80  4.2   |  16.0<L>  |  0.81    Ca    9.0      19 Jun 2018 16:49    TPro  9.3<H>  /  Alb  4.1  /  TBili  0.3<L>  /  DBili  x   /  AST  86<H>  /  ALT  46<H>  /  AlkPhos  113  06-19    PT/INR - ( 19 Jun 2018 16:49 )   PT: 11.9 sec;   INR: 1.08 ratio         PTT - ( 19 Jun 2018 16:49 )  PTT:34.0 sec

## 2018-06-19 NOTE — ED PROVIDER NOTE - MEDICAL DECISION MAKING DETAILS
Will put in place brace for fracture, obtain head ct, and x-ray. Administer pain control, and order basic labs.
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
## 2018-06-19 NOTE — PROCEDURE NOTE - NSPOSTCAREGUIDE_GEN_A_CORE
Elevate the injured extremity as instructed/Keep the cast/splint/dressing clean and dry/Verbal/written post procedure instructions were given to patient/caregiver

## 2018-06-19 NOTE — ED PROVIDER NOTE - MUSCULOSKELETAL, MLM
Spine appears normal, range of motion is not limited, no muscle or joint tenderness TTP on L wrist. Moving all extremities
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
## 2018-06-19 NOTE — ED PROVIDER NOTE - CARE PLAN
Principal Discharge DX:	Radial fracture  Secondary Diagnosis:	Confusion Principal Discharge DX:	Radial fracture  Secondary Diagnosis:	Confusion  Secondary Diagnosis:	Concussion
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
## 2019-08-04 ENCOUNTER — TRANSCRIPTION ENCOUNTER (OUTPATIENT)
Age: 59
End: 2019-08-04

## 2020-11-13 NOTE — PROGRESS NOTE ADULT - PROBLEM SELECTOR PLAN 3
PAT for COVID-19  Provider: Ismael Sterling  Surgery Date: 11/18/2020
Electrolytes stable, magnesium 2.2  -cardiology consult noted and QT falsely prolonged secondary to P wave  -Holding seroquel/remeron for now  -ekg daily
Electrolytes stable, magnesium 2.2  -cardiology consult noted and QT falsely prolonged secondary to P wave  -Holding seroquel/remeron for now and d/w patient who understands and is doing well off of the medication   -ekg daily
Electrolytes stable, magnesium 2.2  -cardiology consult noted and QT falsely prolonged secondary to P wave  -repeat ekg show normalized qtc   -telemetry discontinued   -Holding seroquel/remeron for now and d/w patient who understands and is doing well off of the medication

## 2020-12-15 PROBLEM — Z87.898 HISTORY OF VAGINAL INFECTION: Status: RESOLVED | Noted: 2017-06-12 | Resolved: 2020-12-15

## 2021-04-01 NOTE — ED PROVIDER NOTE - MUSCULOSKELETAL [+], MLM
[Difficulty Walking] : difficulty walking [Sleep Disturbances] : sleep disturbances [Negative] : Heme/Lymph left flank pain, CVA tenderness on left/BACK PAIN
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
## 2022-07-28 NOTE — ED PROVIDER NOTE - PSH
Adrenal cortex disease  partial adrenal gland removed   H/O foot surgery  left ankle   S/P appendectomy    S/P  section    S/P cholecystectomy    S/P hysterectomy  2015  S/P tubal ligation  and ablation    infusive port 2015
No

## 2022-10-11 NOTE — H&P ADULT - NSHPPHYSICALEXAM_GEN_ALL_CORE
- - - Vital Signs Last 24 Hrs  T(C): 36.7 (19 Jun 2018 13:16), Max: 36.7 (19 Jun 2018 13:16)  T(F): 98.1 (19 Jun 2018 13:16), Max: 98.1 (19 Jun 2018 13:16)  HR: 98 (19 Jun 2018 13:16) (98 - 98)  BP: 116/81 (19 Jun 2018 13:16) (116/81 - 116/81)  BP(mean): --  RR: 16 (19 Jun 2018 13:16) (16 - 16)  SpO2: 99% (19 Jun 2018 13:16) (99% - 99%)    Physical Exam:  airway intact   full bilat bs    2+central and peripheral pulses   gcs 15   PERRLA       Constitutional: NAD  HEENT: PERRL, EOMI  Neck: No JVD, FROM without pain  Respiratory: Breath Sounds equal & clear to auscultation, no accessory muscle use  Cardiovascular: Regular rate & rhythm, S1, S2  Gastrointestinal: Soft, non-tender pelvis stable   Extremities: No peripheral edema, No cyanosis  Neurological: A&O x 3; without gross deficit, GCS: 15  Musculoskeletal: No joint pain, swelling, deformity, or point tenderness; no limitation of movement x 3 extremities   Left UE splint c/d/i, NVI

## 2024-10-09 NOTE — ED ADULT NURSE REASSESSMENT NOTE - NS ED NURSE REASSESS COMMENT FT1
Report received from off going RN, charting as noted. Patient received alert with confusion, LCW port in place, patent. Respirations even & unlabored, denies any chest pain. IV site intact to right hand. Report received from off going RN, charting as noted. Patient received alert with confusion, LCW port in place, patent. Respirations even & unlabored, denies any chest pain. IV site intact to right hand. Allergy band in place to arm. Full ROM x4. Will continue to monitor. show

## 2024-10-18 NOTE — DISCHARGE NOTE ADULT - NS AS DC PROVIDER CONTACT Y/N MULTI
Yes You can access the FollowMyHealth Patient Portal offered by [facility] by registering at the following website: [email or web address]. By joining [facility] FollowMyHealth portal, you will also be able to view your health information using other applications (apps) compatible with our system.